# Patient Record
Sex: FEMALE | Race: ASIAN | NOT HISPANIC OR LATINO | Employment: UNEMPLOYED | URBAN - METROPOLITAN AREA
[De-identification: names, ages, dates, MRNs, and addresses within clinical notes are randomized per-mention and may not be internally consistent; named-entity substitution may affect disease eponyms.]

---

## 2017-01-05 ENCOUNTER — OFFICE VISIT (OUTPATIENT)
Dept: FAMILY MEDICINE | Facility: CLINIC | Age: 42
End: 2017-01-05

## 2017-01-05 VITALS
BODY MASS INDEX: 28.3 KG/M2 | HEIGHT: 59 IN | OXYGEN SATURATION: 98 % | DIASTOLIC BLOOD PRESSURE: 69 MMHG | HEART RATE: 68 BPM | SYSTOLIC BLOOD PRESSURE: 105 MMHG | RESPIRATION RATE: 20 BRPM | WEIGHT: 140.4 LBS | TEMPERATURE: 98.2 F

## 2017-01-05 DIAGNOSIS — H54.7 VISION PROBLEMS: ICD-10-CM

## 2017-01-05 DIAGNOSIS — R07.0 THROAT PAIN: ICD-10-CM

## 2017-01-05 DIAGNOSIS — K21.9 GASTROESOPHAGEAL REFLUX DISEASE, ESOPHAGITIS PRESENCE NOT SPECIFIED: ICD-10-CM

## 2017-01-05 DIAGNOSIS — E87.6 HYPOKALEMIA: Primary | ICD-10-CM

## 2017-01-05 LAB
BUN SERPL-MCNC: 11 MG/DL (ref 5–24)
CALCIUM SERPL-MCNC: 9.1 MG/DL (ref 8.5–10.4)
CHLORIDE SERPLBLD-SCNC: 103 MMOL/L (ref 94–109)
CO2 SERPL-SCNC: 26 MMOL/L (ref 20–32)
CREAT SERPL-MCNC: 0.5 MG/DL (ref 0.6–1.3)
EGFR CALCULATED (BLACK REFERENCE): 175.7 ML/MIN
EGFR CALCULATED (NON BLACK REFERENCE): 145.2 ML/MIN
GLUCOSE SERPL-MCNC: 132 MG/DL (ref 60–109)
POTASSIUM SERPL-SCNC: 3.8 MMOL/L (ref 3.4–5.3)
SODIUM SERPL-SCNC: 138 MMOL/L (ref 133–144)

## 2017-01-05 RX ORDER — POTASSIUM CHLORIDE 1500 MG/1
20 TABLET, EXTENDED RELEASE ORAL DAILY
Qty: 60 TABLET | Refills: 5 | Status: SHIPPED | OUTPATIENT
Start: 2017-01-05 | End: 2017-11-07

## 2017-01-05 NOTE — MR AVS SNAPSHOT
"              After Visit Summary   1/5/2017    Jimmie Barrientos    MRN: [id number]           Patient Information     Date Of Birth          1975        Visit Information        Provider Department      1/5/2017 9:00 AM Suzan Alarcon MD [facility]        Today's Diagnoses     Hypokalemia    -  1     Gastroesophageal reflux disease, esophagitis presence not specified         Vision problems           Care Instructions    Your medication list is printed, please keep this with you, it is helpful to bring this current list to any other medical appointments, the emergency room or hospital.    If you had lab testing today and your results are reassuring or normal they will be be mailed to you within 7 days.     If the lab tests need quick action we will call you with the results.   The phone number we will call with results is # [phone] (home) . If this is not the best number please call our clinic and change the number.    If you need any refills please call your pharmacy and they will contact us.    If you have any further concerns or wish to schedule another appointment you must call our office during normal business hours  [phone] (8-5:00 M-F)  If you have urgent medical questions that cannot wait  you may also call [phone] at any time of day.  If you have a medical emergency please call [phone].    Thank you for coming to [facility].      Potassium-Rich Foods  The normal adult diet usually contains 2,000 mcg to 4,000 mcg (50 to 100 mEq) of potassium per day. More potassium is needed when there is excess loss of potassium from the body. Diuretic medicines (\"water pills\"), diarrhea, and vomiting can cause this. To increase the amount of potassium in your diet, include these high potassium foods.    [The (*) indicates foods highest in potassium.]  Vegetables  Artichokes - cooked 1/2 cup, 200 mg to 300 mg*  Asparagus - cooked 1/2 cup, 200 mg to 300 mg  Beans, white, red, chirinos cooked 1/2 " cup, 300 mg to 500 mg*  Beets - cooked 1/2 cup, 200 mg to 300 mg  Broccoli - cooked or raw 1 cup, 200 mg to 500 mg*  Buckeye sprouts - cooked 1/2 cup, 200 mg to 300 mg  Cabbage - raw 1 cup, 100 mg to 200 mg  Carrots - raw or cooked 1/2 cup, 100 mg to 200 mg  Celery - raw 1 cup, 300 mg to 400 mg   Lima Beans - fresh or frozen 1/2 cup, 300 mg to 500 mg*   Mushrooms - raw or cooked 1/2 cup, 100 mg to 300 mg  Peas - cooked 1/2 cup, 200 mg to 300 mg   Potatoes - baked 1 medium, 500 mg to 900 mg*   Spinach - raw 2 cups, 300 mg to 400 mg *  Spinach - cooked 1 cups, 800 mg to 900 mg*   Squash, winter - fresh, frozen, or cooked 1/2 cup, 200 mg to 400 mg   Tomato - fresh 1 medium, 200 mg to 300 mg   Tomato juice - canned 1/2 cup, 200 mg to 300 mg   Fruits  Apple juice - unsweetened 1 cup, 200 mg to 300 mg   Apricots - canned 1/2 cup, 200 mg to 300 mg   Apricots - dried 4 pieces, 100 mg to 200 mg   Avocado - raw 1/2 cup, 300 mg to 400 mg*  Banana - fresh 1 small, 300 mg to 400 mg*   Blackberries - fresh or frozen 1 cup, 200 mg to 300 mg   Cantaloupe - fresh 1 cup diced, 300 mg to 400 mg*   Grape juice - unsweetened 1 cup, 200 mg to 300 mg   Honeydew melon - fresh 1 cup diced, 300 mg to 400 mg*   Orange juice - unsweetened, fresh or frozen 1/2 cup, 200 mg to 300 mg  Orange - fresh 1 medium, 200 mg to 300 mg    Pineapple juice - unsweetened 1 cup, 300 mg to 400 mg   Prune juice - unsweetened 1/2 cup, 300 mg to 400 mg*   Prunes - dried 5 pieces, 300 mg to 400 mg*   Strawberries - fresh or frozen 1 cup, 200 mg to 300 mg  Meat  Red Meat - cooked 3 oz, 100 mg to 300 mg   Shrimp - cooked 3/4 cup, 100 mg to 200 mg   Tuna - fresh or canned 3/4 cup, 200 mg to 500 mg   Cod, flounder, halibut - cooked 3 oz, 100 mg to 300 mg*  Gerry - cooked 3 oz, 300 mg to 400 mg*   Scallops - cooked 3 oz, 200 mg to 300 mg*    9475-5054 The [employer]. [address] [place] [address]. All rights reserved. This information is not  "intended as a substitute for professional medical care. Always follow your healthcare professional's instructions.                Follow-ups after your visit        Who to contact     Please call your clinic at [phone] to:    Ask questions about your health    Make or cancel appointments    Discuss your medicines    Learn about your test results    Speak to your doctor   If you have compliments or concerns about an experience at your clinic, or if you wish to file a complaint, please contact [facility] Patient Relations at [phone] or email us at [email or web address]         Additional Information About Your Visit        Auto [email or web address] Information     [employer]asis is an electronic gateway that provides easy, online access to your medical records. With Moasis, you can request a clinic appointment, read your test results, renew a prescription or communicate with your care team.     To sign up for Mo[employer] visit the website at [email or web address]   You will be asked to enter the access code listed below, as well as some personal information. Please follow the directions to create your username and password.     Your access code is: [email or web address]  Expires: 2017  4:59 PM     Your access code will  in 90 days. If you need help or a new code, please contact your [facility] or call [phone] for assistance.        Care EveryWhere ID     This is your Care EveryWhere ID. This could be used by other organizations to access your [facility] medical records  [id number]        Your Vitals Were     Pulse Temperature Respirations Height BMI (Body Mass Index) Pulse Oximetry    68 98.2  F (36.8  C) (Oral) 20 4' 11\" (149.9 cm) 28.34 kg/m2 98%    Last Period                   2017            Blood Pressure from Last 3 Encounters:   17 105/69   16 112/78   16 116/79    Weight from Last 3 Encounters:   17 140 lb 6.4 oz " (63.685 kg)   11/07/16 140 lb (63.504 kg)   11/04/16 142 lb 6.4 oz (64.592 kg)              We Performed the Following     Basic Metabolic Panel ([facility])  - Results < 1 hr        Primary Care Provider Office Phone # Fax #    Suzan Alarcon [phone]       [facility] [address] [place] [address]        Thank you!     Thank you for choosing [facility]  for your care. Our goal is always to provide you with excellent care. Hearing back from our patients is one way we can continue to improve our services. Please take a few minutes to complete the written survey that you may receive in the mail after your visit with us. Thank you!             Your Updated Medication List - Protect others around you: Learn how to safely use, store and throw away your medicines at www.disposemymeds.org.          This list is accurate as of: 1/5/17  9:45 AM.  Always use your most recent med list.                   Brand Name Dispense Instructions for use    azelastine 0.05 % Soln ophthalmic solution    OPTIVAR    1 Bottle    Place 1 drop into the right eye 2 times daily       cetirizine 10 MG tablet    zyrTEC    30 tablet    Take 1 tablet (10 mg) by mouth every evening       ENSURE Liqd     30 each    Take 1 Can by mouth daily       norethindrone-ethinyl estradiol 1-35 MG-MCG per tablet    ORTHO-NOVUM 1/35 (28)    84 tablet    Take 1 tablet by mouth daily Begin on Sunday after start of period       omeprazole 20 MG CR capsule    priLOSEC    30 capsule    Take 1 capsule (20 mg) by mouth 2 times daily       potassium chloride SA 20 MEQ CR tablet    potassium chloride    60 tablet    Take 1 tablet (20 mEq) by mouth 2 times daily

## 2017-01-05 NOTE — NURSING NOTE
name: Francie Barrientos  Language: Hmong  Agency: [employer]  Phone number: [phone]    Pt. Declined the PSSV 23.

## 2017-01-05 NOTE — PROGRESS NOTES
HPI:   Jimmie Barrientos is a 41 year old  female with medical history of prediabetes and hepatitis B carrier who presents to follow up fatigue and abdominal pain.     Has been feeling better, more energy, no more abdominal pain, has been eating more. Was not feeling any pain so decreased her omeprazole to once per day.     Has noticed gradual change in vision, problems with viewing things up close, has not noticed one eye more than the other. No problems with eye pain, watery eyes, or itching. Has not had vision tested yet this year, but is not sure when she is due for another check (is concerned that insurance covers only once per year). Has never needed glasses.     Periods have been regular, once per month since last visit. May be interested in birth control in the future, but not at this visit.     Has been taking potassium supplement once per day for the past couple days, was taking it twice a day previously. Denies any chest pain or palpitations.    A Shoulder Tap  was used for this visit         PMHX:     Patient Active Problem List   Diagnosis     Health Care Home     Hepatitis B carrier     Pap smear for cervical cancer screening     Prediabetes     Overweight       Current Outpatient Prescriptions   Medication Sig Dispense Refill     omeprazole (PRILOSEC) 20 MG CR capsule Take 1 capsule (20 mg) by mouth daily 30 capsule 1     potassium chloride SA (POTASSIUM CHLORIDE) 20 MEQ CR tablet Take 1 tablet (20 mEq) by mouth daily 60 tablet 5     Nutritional Supplements (ENSURE) LIQD Take 1 Can by mouth daily 30 each 0     cetirizine (ZYRTEC) 10 MG tablet Take 1 tablet (10 mg) by mouth every evening 30 tablet 1     azelastine (OPTIVAR) 0.05 % SOLN Place 1 drop into the right eye 2 times daily 1 Bottle 1       Social History   Substance Use Topics     Smoking status: Never Smoker      Smokeless tobacco: Never Used     Alcohol Use: No       Social History     Social History Narrative       Allergies   Allergen  "Reactions     No Known Allergies        No results found for this or any previous visit (from the past 24 hour(s)).         Review of Systems:   Complete Review of Systems negative except as above.          Physical Exam:     Filed Vitals:    01/05/17 0908   BP: 105/69   Pulse: 68   Temp: 98.2  F (36.8  C)   TempSrc: Oral   Resp: 20   Height: 4' 11\" (149.9 cm)   Weight: 140 lb 6.4 oz (63.685 kg)   SpO2: 98%     Body mass index is 28.34 kg/(m^2).    General: Alert, well-appearing female in NAD  HEENT: PERRL, moist oral mucus membranes  Pulm: CTA BL, no tachypnea  CV: RRR, no murmur  Abd: soft, NTND, no masses  Ext: Warm, well perfused, no LE edema  Skin: No rash on limited skin exam  Psych: Mood appropriate to visit content, full affect, rational thought content and process      Assessment and Plan   1. Hypokalemia, etiology unclear, not on diuretics, did report low PO intake at the last visit which she states is improving. She has been feeling so much better that she decreased her potassium to once per day over the past couple days. On recheck potassium is 3.8. Will have patient continue with Kdur 20mEq daily as this is how she has been taking it. Provided information about potassium rich foods. Recheck at next visit.  - Basic Metabolic Panel ([facility])  - Results < 1 hr  - potassium chloride SA (POTASSIUM CHLORIDE) 20 MEQ CR tablet; Take 1 tablet (20 mEq) by mouth daily  Dispense: 60 tablet; Refill: 5    2. Gastroesophageal reflux disease, esophagitis presence not specified, much improved per patient. She reduced her omeprazole to once daily and has been doing well, will continue at this dose.   - omeprazole (PRILOSEC) 20 MG CR capsule; Take 1 capsule (20 mg) by mouth daily  Dispense: 30 capsule; Refill: 1    3. Vision problems, likely age related presbyopia, recommended vision testing but patient was hesitant, saying that her insurance only covers testing once per year and she would like to check with her " ophthalmologist before proceeding. Recommended trial OTC reading glasses.    Options for treatment and follow-up care were reviewed with the patient and/or guardian. Jimmie Barrientos and/or guardian engaged in the decision making process and verbalized understanding of the options discussed and agreed with the final plan.    Suzan Alarcon MD  [facility], PGY1    Precepted with: Eliza Anderson MD

## 2017-01-05 NOTE — PATIENT INSTRUCTIONS
"Your medication list is printed, please keep this with you, it is helpful to bring this current list to any other medical appointments, the emergency room or hospital.    If you had lab testing today and your results are reassuring or normal they will be be mailed to you within 7 days.     If the lab tests need quick action we will call you with the results.   The phone number we will call with results is # [phone] (home) . If this is not the best number please call our clinic and change the number.    If you need any refills please call your pharmacy and they will contact us.    If you have any further concerns or wish to schedule another appointment you must call our office during normal business hours  [phone] (8-5:00 M-F)  If you have urgent medical questions that cannot wait  you may also call [phone] at any time of day.  If you have a medical emergency please call 911.    Thank you for coming to [facility].      Potassium-Rich Foods  The normal adult diet usually contains 2,000 mcg to 4,000 mcg (50 to 100 mEq) of potassium per day. More potassium is needed when there is excess loss of potassium from the body. Diuretic medicines (\"water pills\"), diarrhea, and vomiting can cause this. To increase the amount of potassium in your diet, include these high potassium foods.    [The (*) indicates foods highest in potassium.]  Vegetables  Artichokes - cooked 1/2 cup, 200 mg to 300 mg*  Asparagus - cooked 1/2 cup, 200 mg to 300 mg  Beans, white, red, chirinos cooked 1/2 cup, 300 mg to 500 mg*  Beets - cooked 1/2 cup, 200 mg to 300 mg  Broccoli - cooked or raw 1 cup, 200 mg to 500 mg*  [place] sprouts - cooked 1/2 cup, 200 mg to 300 mg  Cabbage - raw 1 cup, 100 mg to 200 mg  Carrots - raw or cooked 1/2 cup, 100 mg to 200 mg  Celery - raw 1 cup, 300 mg to 400 mg   Lima Beans - fresh or frozen 1/2 cup, 300 mg to 500 mg*   Mushrooms - raw or cooked 1/2 cup, 100 mg to 300 mg  Peas - cooked 1/2 cup, " 200 mg to 300 mg   Potatoes - baked 1 medium, 500 mg to 900 mg*   Spinach - raw 2 cups, 300 mg to 400 mg *  Spinach - cooked 1 cups, 800 mg to 900 mg*   Squash, winter - fresh, frozen, or cooked 1/2 cup, 200 mg to 400 mg   Tomato - fresh 1 medium, 200 mg to 300 mg   Tomato juice - canned 1/2 cup, 200 mg to 300 mg   Fruits  Apple juice - unsweetened 1 cup, 200 mg to 300 mg   Apricots - canned 1/2 cup, 200 mg to 300 mg   Apricots - dried 4 pieces, 100 mg to 200 mg   Avocado - raw 1/2 cup, 300 mg to 400 mg*  Banana - fresh 1 small, 300 mg to 400 mg*   Blackberries - fresh or frozen 1 cup, 200 mg to 300 mg   Cantaloupe - fresh 1 cup diced, 300 mg to 400 mg*   Grape juice - unsweetened 1 cup, 200 mg to 300 mg   Honeydew melon - fresh 1 cup diced, 300 mg to 400 mg*   Orange juice - unsweetened, fresh or frozen 1/2 cup, 200 mg to 300 mg  Orange - fresh 1 medium, 200 mg to 300 mg    Pineapple juice - unsweetened 1 cup, 300 mg to 400 mg   Prune juice - unsweetened 1/2 cup, 300 mg to 400 mg*   Prunes - dried 5 pieces, 300 mg to 400 mg*   Strawberries - fresh or frozen 1 cup, 200 mg to 300 mg  Meat  Red Meat - cooked 3 oz, 100 mg to 300 mg   Shrimp - cooked 3/4 cup, 100 mg to 200 mg   Tuna - fresh or canned 3/4 cup, 200 mg to 500 mg   Cod, flounder, halibut - cooked 3 oz, 100 mg to 300 mg*  [place] - cooked 3 oz, 300 mg to 400 mg*   Scallops - cooked 3 oz, 200 mg to 300 mg*    2857-7390 [employer]. [address] [place] [address], [place] [address]. All rights reserved. This information is not intended as a substitute for professional medical care. Always follow your healthcare professional's instructions.

## 2017-01-09 DIAGNOSIS — R07.0 THROAT PAIN: Primary | ICD-10-CM

## 2017-01-10 NOTE — PROGRESS NOTES
Preceptor Attestation:  Patient's case reviewed and discussed with Suzan Alarcon MD resident and I evaluated the patient. I agree with written assessment and plan of care.  Supervising Physician:  RUDY AMAYA MD  [facility]

## 2017-11-07 ENCOUNTER — OFFICE VISIT (OUTPATIENT)
Dept: FAMILY MEDICINE | Facility: CLINIC | Age: 42
End: 2017-11-07

## 2017-11-07 VITALS
TEMPERATURE: 98.2 F | SYSTOLIC BLOOD PRESSURE: 107 MMHG | DIASTOLIC BLOOD PRESSURE: 73 MMHG | WEIGHT: 149.8 LBS | HEART RATE: 73 BPM | RESPIRATION RATE: 18 BRPM | BODY MASS INDEX: 30.2 KG/M2 | OXYGEN SATURATION: 98 % | HEIGHT: 59 IN

## 2017-11-07 DIAGNOSIS — R73.03 PREDIABETES: ICD-10-CM

## 2017-11-07 DIAGNOSIS — J06.9 VIRAL URI WITH COUGH: Primary | ICD-10-CM

## 2017-11-07 DIAGNOSIS — E87.6 HYPOKALEMIA: ICD-10-CM

## 2017-11-07 LAB
BUN SERPL-MCNC: 6 MG/DL (ref 5–24)
CALCIUM SERPL-MCNC: 9.1 MG/DL (ref 8.5–10.4)
CHLORIDE SERPLBLD-SCNC: 100 MMOL/L (ref 94–109)
CO2 SERPL-SCNC: 26 MMOL/L (ref 20–32)
CREAT SERPL-MCNC: 0.8 MG/DL (ref 0.6–1.3)
CREAT UR-MCNC: 215 MG/DL
EGFR CALCULATED (BLACK REFERENCE): >90 ML/MIN
EGFR CALCULATED (NON BLACK REFERENCE): 84 ML/MIN
GLUCOSE SERPL-MCNC: 228 MG/DL (ref 60–109)
HBA1C MFR BLD: 6.5 % (ref 4.1–5.7)
MICROALBUMIN UR-MCNC: 0.98 MG/DL (ref 0–1.99)
MICROALBUMIN/CREAT UR: 4.6 MG/G
POTASSIUM SERPL-SCNC: 3.2 MMOL/L (ref 3.4–5.3)
SODIUM SERPL-SCNC: 135 MMOL/L (ref 133–144)

## 2017-11-07 RX ORDER — POTASSIUM CHLORIDE 1500 MG/1
20 TABLET, EXTENDED RELEASE ORAL 2 TIMES DAILY
Qty: 60 TABLET | Refills: 5 | Status: SHIPPED | OUTPATIENT
Start: 2017-11-07 | End: 2018-02-14

## 2017-11-07 RX ORDER — GUAIFENESIN/DEXTROMETHORPHAN 100-10MG/5
5 SYRUP ORAL EVERY 4 HOURS PRN
Qty: 560 ML | Refills: 0 | Status: SHIPPED | OUTPATIENT
Start: 2017-11-07 | End: 2018-02-14

## 2017-11-07 NOTE — NURSING NOTE
Pre DM  Eye referral?  MACR-in lab  PPV23-not now  Flu shot-declined until she feels better  A1c-in lab  Foot exam  Lipid  Per MD wait and see, may have pt return for DM visit  Follow up in 2-4 weeks for Prediabetes check

## 2017-11-07 NOTE — PROGRESS NOTES
"S:  Jimmie Barrientos is a 42 year old year old female with history of GERD and prediabetes who presents to discuss:    1. Sore throat: Pt has had 1 week of sore throat and cough. She describes the sore throat as the first symptoms and then a productive cough and headache shortly after. She has not taken her temperature, but does not feel like she has had a fever. She denies other symptoms and has no ear pain, neck pain or lumps, facial tenderness, or difficulty breathing. She has no known sick contacts. She has not been taking her omeprazole for reflux, no reflux symptoms. She has no other complaints or concerns.     Complete ROS otherwise negative.     O:  Vitals: /73  Pulse 73  Temp 98.2  F (36.8  C) (Oral)  Resp 18  Ht 4' 10.75\" (149.2 cm)  Wt 149 lb 12.8 oz (67.9 kg)  SpO2 98%  BMI 30.51 kg/m2    General: Alert, well-appearing, NAD.  HEENT: PERRL, moist oral mucus membranes. No tonsillar exudates. No pharyngeal erythema. No uvular deviation or PTA. TM's normal bilaterally with no bulging or erythema. Ear canals without drainage.   Neck: no anterior or posterior cervical lymphadenopathy  Pulm: CTA BL, no tachypnea. No respiratory distress.   CV: RRR, no murmur  Abd: soft, NTND, no masses. No splenomegaly  Ext: Warm, well perfused  Skin: No rash on limited skin exam  Psych: Mood appropriate to visit content, full affect, rational thought content and process      A/P:    1. Viral URI: sore throat, cough, headache all consistent with viral URI. No evidence to suggest more dangerous etiology such as strep pharyngitis, PTA, sinusitis, mononucleosis, etc. Patient will receive symptomatic     2. Impaired fasting glucose: we will recheck this again today and schedule an appointment for education about her blood sugar and lifestyle modifications.   - Hemoglobin A1c ([facility])  - Basic Metabolic Panel ([facility]) - Results < 1 hr  Labs show A1c 6.5 in diabetic range. We discussed a close follow up visit in 2-4 weeks to " talk about diabetes. Discussed that even though patient feels fine, her labs show high blood sugars which can lead to being sick later on.    3. History of hypokalemia. Will get BMP today to recheck this. Unclear etiology in past, but may be related to blood sugar.  Potassium is 3.2 likely related to diuresis with high blood sugars. Will increase K supplementation and recheck at next visit.     Nacho Tan, MS4    The medical student acted as a scribe and the encounter documented above was performed completely by me and the documentation accurately reflects the work I have performed today.      Suzan lAarcon MD  [facility] Resident  Pager     Precepted with: Nikolay Wade MD

## 2017-11-07 NOTE — MR AVS SNAPSHOT
"              After Visit Summary   11/7/2017    Jimmie Barrientos    MRN: [id number]           Patient Information     Date Of Birth          1975        Visit Information        Provider Department      11/7/2017 9:40 AM Suzan Alarcon MD [facility]        Today's Diagnoses     Viral URI with cough    -  1    Prediabetes           Follow-ups after your visit        Follow-up notes from your care team     Return in about 2 weeks (around 11/21/2017) for diabetes.      Your next 10 appointments already scheduled     Nov 21, 2017  9:40 AM CST   Return Visit with Suzan Alarcon MD   [facility] ([facility])    [address] [place] [address]   [phone]              Who to contact     Please call your clinic at [phone] to:    Ask questions about your health    Make or cancel appointments    Discuss your medicines    Learn about your test results    Speak to your doctor   If you have compliments or concerns about an experience at your clinic, or if you wish to file a complaint, please contact [facility] Patient Relations at [phone] or email us at [email or web address]         Additional Information About Your Visit        Care EveryWhere ID     This is your Care EveryWhere ID. This could be used by other organizations to access your [place] medical records  [id number]        Your Vitals Were     Pulse Temperature Respirations Height Pulse Oximetry BMI (Body Mass Index)    73 98.2  F (36.8  C) (Oral) 18 4' 10.75\" (149.2 cm) 98% 30.51 kg/m2       Blood Pressure from Last 3 Encounters:   11/07/17 107/73   01/05/17 105/69   11/07/16 112/78    Weight from Last 3 Encounters:   11/07/17 149 lb 12.8 oz (67.9 kg)   01/05/17 140 lb 6.4 oz (63.7 kg)   11/07/16 140 lb (63.5 kg)              We Performed the Following     Basic Metabolic Panel ([facility]) - Results < 1 hr     Hemoglobin A1c ([facility])     Microalbumin Creatinine Ratio Random Ur " ([facility])          Today's Medication Changes          These changes are accurate as of: 11/7/17 10:47 AM.  If you have any questions, ask your nurse or doctor.               Start taking these medicines.        Dose/Directions    guaiFENesin-dextromethorphan 100-10 MG/5ML syrup   Commonly known as:  ROBITUSSIN DM   Used for:  Viral URI with cough   Started by:  Suzan Alarcon MD        Dose:  5 mL   Take 5 mLs by mouth every 4 hours as needed for cough   Quantity:  560 mL   Refills:  0         These medicines have changed or have updated prescriptions.        Dose/Directions    potassium chloride SA 20 MEQ CR tablet   Commonly known as:  KLOR-CON   This may have changed:  when to take this   Used for:  Hypokalemia   Changed by:  Suzan Alarcon MD        Dose:  20 mEq   Take 1 tablet (20 mEq) by mouth 2 times daily   Quantity:  60 tablet   Refills:  5            Where to get your medicines      These medications were sent to [facility] - [place], [place] - [address] [place] [address] Pkw[address]  [address], [place] [address]     Phone:  [phone]     guaiFENesin-dextromethorphan 100-10 MG/5ML syrup    potassium chloride SA 20 MEQ CR tablet                Primary Care Provider Office Phone # Fax #    Suzan Alarcon [phone]       [facility] [address] [place] [address]        Equal Access to Services     [facility]EL[facility] AH: Hadii aad ku hadasho Soomaali, waaxda luqadaha, qaybta kaalmada adeegyada, santo obwen haystephanie hong . So [facility] [phone].    ATENCIÓN: Si habla español, tiene a lopez disposición servicios gratuitos de asistencia lingüística. Janiya al [phone].    We comply with applicable federal civil rights laws and [place] laws. We do not discriminate on the basis of race, color, national origin, age, disability, sex, sexual orientation, or gender identity.            Thank you!     Thank you for choosing [facility]  for your  care. Our goal is always to provide you with excellent care. Hearing back from our patients is one way we can continue to improve our services. Please take a few minutes to complete the written survey that you may receive in the mail after your visit with us. Thank you!             Your Updated Medication List - Protect others around you: Learn how to safely use, store and throw away your medicines at www.disposemymeds.org.          This list is accurate as of: 11/7/17 10:47 AM.  Always use your most recent med list.                   Brand Name Dispense Instructions for use Diagnosis    azelastine 0.05 % Soln ophthalmic solution    OPTIVAR    1 Bottle    Place 1 drop into the right eye 2 times daily    Chronic allergic conjunctivitis       cetirizine 10 MG tablet    zyrTEC    30 tablet    Take 1 tablet (10 mg) by mouth every evening    Throat pain       ENSURE Liqd     30 each    Take 1 Can by mouth daily    Other fatigue, Hypokalemia       guaiFENesin-dextromethorphan 100-10 MG/5ML syrup    ROBITUSSIN DM    560 mL    Take 5 mLs by mouth every 4 hours as needed for cough    Viral URI with cough       omeprazole 20 MG CR capsule    priLOSEC    30 capsule    Take 1 capsule (20 mg) by mouth daily    Throat pain       potassium chloride SA 20 MEQ CR tablet    KLOR-CON    60 tablet    Take 1 tablet (20 mEq) by mouth 2 times daily    Hypokalemia

## 2017-11-07 NOTE — PROGRESS NOTES
Preceptor Attestation:  Patient's case reviewed and discussed with Suzan Alarcon MD resident and I evaluated the patient. I agree with written assessment and plan of care.  Supervising Physician:Nikolay Wade MD    [facility]

## 2017-11-28 ENCOUNTER — RECORDS - HEALTHEAST (OUTPATIENT)
Dept: ADMINISTRATIVE | Facility: OTHER | Age: 42
End: 2017-11-28

## 2017-11-28 ENCOUNTER — OFFICE VISIT (OUTPATIENT)
Dept: FAMILY MEDICINE | Facility: CLINIC | Age: 42
End: 2017-11-28

## 2017-11-28 VITALS
HEIGHT: 59 IN | BODY MASS INDEX: 30.08 KG/M2 | SYSTOLIC BLOOD PRESSURE: 112 MMHG | OXYGEN SATURATION: 98 % | DIASTOLIC BLOOD PRESSURE: 76 MMHG | RESPIRATION RATE: 20 BRPM | WEIGHT: 149.2 LBS | HEART RATE: 71 BPM | TEMPERATURE: 98.4 F

## 2017-11-28 DIAGNOSIS — E87.6 HYPOKALEMIA: ICD-10-CM

## 2017-11-28 DIAGNOSIS — E11.9 TYPE 2 DIABETES MELLITUS WITHOUT COMPLICATION, WITHOUT LONG-TERM CURRENT USE OF INSULIN (H): Primary | ICD-10-CM

## 2017-11-28 LAB
ANION GAP SERPL CALCULATED.3IONS-SCNC: 13 MMOL/L (ref 5–18)
BUN SERPL-MCNC: 10 MG/DL (ref 8–22)
CALCIUM SERPL-MCNC: 9 MG/DL (ref 8.5–10.5)
CHLORIDE SERPL-SCNC: 106 MMOL/L (ref 98–107)
CHOLEST SERPL-MCNC: 140 MG/DL
CO2 SERPL-SCNC: 21 MMOL/L (ref 22–31)
CREAT SERPL-MCNC: 0.67 MG/DL (ref 0.6–1.1)
FASTING?: ABNORMAL
GLUCOSE SERPL-MCNC: 128 MG/DL (ref 70–125)
HDLC SERPL-MCNC: 44 MG/DL
LDLC SERPL CALC-MCNC: 72 MG/DL
POTASSIUM SERPL-SCNC: 4 MMOL/L (ref 3.5–5)
SODIUM SERPL-SCNC: 140 MMOL/L (ref 136–145)
TRIGL SERPL-MCNC: 119 MG/DL

## 2017-11-28 NOTE — MR AVS SNAPSHOT
After Visit Summary   11/28/2017    Jimmie Famg    MRN: [id number]           Patient Information     Date Of Birth          1975        Visit Information        Provider Department      11/28/2017 8:20 AM Suzan Alarcon MD [facility]        Today's Diagnoses     Type 2 diabetes mellitus without complication, without long-term current use of insulin (H)    -  1    Hypokalemia          Care Instructions    Thanks for coming in today [facility]!    Topics discussed this visit:   1. Type 2 diabetes mellitus without complication, without long-term current use of insulin (H)  - OPHTHALMOLOGY ADULT REFERRAL; Future  - DIABETES EDUCATOR REFERRAL; Future  - Lipid Panel ([facility]) - Results < 1 hr    2. Hypokalemia (low potassium)  - Basic Metabolic Panel ([facility] )  - Results < 1 hr          Follow up in 2-4 weeks to talk more about diabetes.       Your medication list is printed, please keep this with you, it is helpful to bring this current list to any other medical appointments, the emergency room or hospital.    If you had lab testing today and your results are reassuring or normal they will be be mailed to you within 7 days.     If the lab tests need quick action we will call you with the results.   The phone number we will call with results is # [phone] (home) . If this is not the best number please call our clinic and change the number.    If you need any refills please call your pharmacy and they will contact us.    If you have any further concerns or wish to schedule another appointment you must call our office during normal business hours  [phone] (8-5:00 M-F)  If you have urgent medical questions that cannot wait  you may also call [phone] at any time of day.  If you have a medical emergency please call [phone].    Thank you for coming to [facility].            Follow-ups after your visit        Additional Services     DIABETES EDUCATOR REFERRAL       New  Diagnosis    Diabetes Education Order:  Choose either self management    Diabetes Self Management Class  One on One with Diabetes Educator:  Medical Nutrition Therapy, Glucose Monitoring, Meal Planning  and Weight Management/Exercise   Insulin Start or Adjust:                        New to insulin?  No                        RN to calculate and adjust insulin?                           Patient to continue oral medicine?  Yes       needed: Yes  Language: Alexander    Recent labs including A1C, Lipid panel, FBS or casual glucose or GCT:    Lab Results       Component                Value               Date                       A1C                      6.5                 11/07/2017                 A1C                      5.9                 09/20/2016            Lab Results       Component                Value               Date                       CHOL                     150                 10/25/2016            Lab Results       Component                Value               Date                       HDL                      49                  10/25/2016            Lab Results       Component                Value               Date                       LDL                      86                  10/25/2016            No results found for: TRIG  No results found for: CHOLHDLRATIO  Lab Results       Component                Value               Date                       GLC                      228.0               11/07/2017                 GLC                      132.0               01/05/2017          ]    ([facility]) Referral should be tracked (Yes/No)?            OPHTHALMOLOGY ADULT REFERRAL       Patient prefers to be called    Reason for Referral: new diabetes, eye exam     needed: Yes  Language: Alexander    May leave message on voicemail: Yes    ([facility] Only) Referral should be tracked (Yes/No)? No                  Future tests that were ordered for you today     Open Future Orders      "   Priority Expected Expires Ordered    OPHTHALMOLOGY ADULT REFERRAL Routine  11/28/2018 11/28/2017    DIABETES EDUCATOR REFERRAL Routine  4/28/2018 11/28/2017            Who to contact     Please call your clinic at [phone] to:    Ask questions about your health    Make or cancel appointments    Discuss your medicines    Learn about your test results    Speak to your doctor   If you have compliments or concerns about an experience at your clinic, or if you wish to file a complaint, please contact [facility] Patient Relations at [phone] or email us at [email or web address]         Additional Information About Your Visit        Care EveryWhere ID     This is your Care EveryWhere ID. This could be used by other organizations to access your [place] medical records  [id number]        Your Vitals Were     Pulse Temperature Respirations Height Pulse Oximetry BMI (Body Mass Index)    71 98.4  F (36.9  C) 20 4' 10.75\" (149.2 cm) 98% 30.39 kg/m2       Blood Pressure from Last 3 Encounters:   11/28/17 112/76   11/07/17 107/73   01/05/17 105/69    Weight from Last 3 Encounters:   11/28/17 149 lb 3.2 oz (67.7 kg)   11/07/17 149 lb 12.8 oz (67.9 kg)   01/05/17 140 lb 6.4 oz (63.7 kg)              We Performed the Following     Basic Metabolic Panel ([facility])  - Results < 1 hr     Lipid Panel ([facility]) - Results < 1 hr        Primary Care Provider Office Phone # Fax #    Suzan Alarcon -[phone]       [facility] [place] [address]        Equal Access to Services     [facility] HospitalRUKHSANA AH: Hadii jing Valdes, waanatoliyda lauren, qaybta natyalsanto leslie. So [facility] [phone].    ATENCIÓN: Si habla español, tiene a lopez disposición servicios gratuitos de asistencia lingüística. Janiya cook [phone].    We comply with applicable federal civil rights laws and [place] laws. We do not discriminate " on the basis of race, color, national origin, age, disability, sex, sexual orientation, or gender identity.            Thank you!     Thank you for choosing [facility]  for your care. Our goal is always to provide you with excellent care. Hearing back from our patients is one way we can continue to improve our services. Please take a few minutes to complete the written survey that you may receive in the mail after your visit with us. Thank you!             Your Updated Medication List - Protect others around you: Learn how to safely use, store and throw away your medicines at www.disposemymeds.org.          This list is accurate as of: 11/28/17  8:59 AM.  Always use your most recent med list.                   Brand Name Dispense Instructions for use Diagnosis    azelastine 0.05 % Soln ophthalmic solution    OPTIVAR    1 Bottle    Place 1 drop into the right eye 2 times daily    Chronic allergic conjunctivitis       cetirizine 10 MG tablet    zyrTEC    30 tablet    Take 1 tablet (10 mg) by mouth every evening    Throat pain       ENSURE Liqd     30 each    Take 1 Can by mouth daily    Other fatigue, Hypokalemia       guaiFENesin-dextromethorphan 100-10 MG/5ML syrup    ROBITUSSIN DM    560 mL    Take 5 mLs by mouth every 4 hours as needed for cough    Viral URI with cough       omeprazole 20 MG CR capsule    priLOSEC    30 capsule    Take 1 capsule (20 mg) by mouth daily    Throat pain       potassium chloride SA 20 MEQ CR tablet    KLOR-CON    60 tablet    Take 1 tablet (20 mEq) by mouth 2 times daily    Hypokalemia

## 2017-11-28 NOTE — PROGRESS NOTES
"SUBJECTIVE:  Jimmie Barrientos is a 42 year old who presents today for follow up of DIABETES MELLITUS. Newly diagnosed at last visit, previously was pre-diabetic range, now A1C is 6.5%.       New diagnosis of diabetes  Mother with diabetes on oral meds  Does not have a BG meter, has never checked BP    Health maintenance reviewed and appropriate orders placed?  Yes      BP Readings from Last 3 Encounters:   11/28/17 112/76   11/07/17 107/73   01/05/17 105/69       Lab Results   Component Value Date    A1C 6.5 11/07/2017    A1C 5.9 09/20/2016    A1C 5.7 10/08/2012       Recent Labs   Lab Test  10/25/16   1210   CHOL  150   HDL  49*   LDL  86       Wt Readings from Last 3 Encounters:   11/28/17 149 lb 3.2 oz (67.7 kg)   11/07/17 149 lb 12.8 oz (67.9 kg)   01/05/17 140 lb 6.4 oz (63.7 kg)       Current Outpatient Prescriptions   Medication Sig Dispense Refill     guaiFENesin-dextromethorphan (ROBITUSSIN DM) 100-10 MG/5ML syrup Take 5 mLs by mouth every 4 hours as needed for cough 560 mL 0     potassium chloride SA (KLOR-CON) 20 MEQ CR tablet Take 1 tablet (20 mEq) by mouth 2 times daily 60 tablet 5     omeprazole (PRILOSEC) 20 MG CR capsule Take 1 capsule (20 mg) by mouth daily 30 capsule 11     Nutritional Supplements (ENSURE) LIQD Take 1 Can by mouth daily 30 each 0     cetirizine (ZYRTEC) 10 MG tablet Take 1 tablet (10 mg) by mouth every evening 30 tablet 1     azelastine (OPTIVAR) 0.05 % SOLN Place 1 drop into the right eye 2 times daily 1 Bottle 1       Histories reviewed and updated in Epic.      ROS:  C: NEGATIVE for fatigue, unexpected change in weight  E: NEGATIVE for acute vision problems or changes  R: NEGATIVE for significant cough or shortness of breath  CV: NEGATIVE for chest pain, palpitations or new or worsening peripheral edema  P: NEGATIVE for changes in mood or affect    EXAM:  Vitals: /76  Pulse 71  Temp 98.4  F (36.9  C)  Resp 20  Ht 4' 10.75\" (149.2 cm)  Wt 149 lb 3.2 oz (67.7 kg)  SpO2 98%  " BMI 30.39 kg/m2  BMIE= Body mass index is 30.39 kg/(m^2).  GENERAL APPEARANCE: alert and no acute distress  PSYCH: mentation appears normal and affect normal/bright  RESP: lungs clear to auscultation - no rales, rhonchi or wheezes  CV: regular rate and rhythm, normal S1 S2, no S3 or S4 and no murmur, click or rub -  EXT: no cyanosis or edema in lower extremities  SKIN: no venous stasis changes  Foot Exam: normal DP and PT pulses, no trophic changes or ulcerative lesions, normal sensory exam and normal monofilament exam    ASSESSMENT AND PLAN:  (E11.9) Type 2 diabetes mellitus without complication, without long-term current use of insulin (H)  (primary encounter diagnosis)  Comment:     Just getting started with DM education and lifestyle modification. Did discuss the possibility of starting oral medication at our next visit. Briefly outlined diabetic diet and lifestyle changes. Also discussed foot and eye exams. I would like to talk more about diabetes and long term plans at next visit in 2-4 weeks.   Plan: OPHTHALMOLOGY ADULT REFERRAL, DIABETES EDUCATOR        REFERRAL, Lipid [place] ([facility]) - Results         > 1hr, CANCELED: Lipid Panel ([facility]) - Results        < 1 hr            (E87.6) Hypokalemia  Comment: currently taking 20mg potassium once per day, 3.2 last time. Will check again. No nausea/vomiting or diarrhea. Thought to be secondary to urinary loss with glucosuria.  Plan: Basic Metabolic Profile ([facility]), CANCELED:        Basic Metabolic Panel ([facility])  - Results < 1         hr        Suzan Alarcon    Precepted with: Yolie Fairbanks MD

## 2017-11-28 NOTE — PATIENT INSTRUCTIONS
Thanks for coming in today Jimmie Barrientos!    Topics discussed this visit:   1. Type 2 diabetes mellitus without complication, without long-term current use of insulin (H)  - OPHTHALMOLOGY ADULT REFERRAL; Future  - DIABETES EDUCATOR REFERRAL; Future  - [facility] - Results < 1 hr    2. Hypokalemia (low potassium)  - Basic Metabolic Panel ([facility])  - Results < 1 hr          Follow up in 2-4 weeks to talk more about diabetes.       Your medication list is printed, please keep this with you, it is helpful to bring this current list to any other medical appointments, the emergency room or hospital.    If you had lab testing today and your results are reassuring or normal they will be be mailed to you within 7 days.     If the lab tests need quick action we will call you with the results.   The phone number we will call with results is # [phone] (home) . If this is not the best number please call our clinic and change the number.    If you need any refills please call your pharmacy and they will contact us.    If you have any further concerns or wish to schedule another appointment you must call our office during normal business hours  [phone] (8-5:00 M-F)  If you have urgent medical questions that cannot wait  you may also call [phone] at any time of day.  If you have a medical emergency please call 144.    Thank you for coming to [facility].    Referral for (Test):OPHTHALMOLOGY   Location/Place/Provider:    Date/Time:    Phone:   Fax:   Additional information/prep.: I called to help the pt setup this appointment, pt stated that she was not interested in getting this done right now.    Scheduled by: FREDDIE Child

## 2017-12-11 NOTE — PROGRESS NOTES
Preceptor Attestation:  Patient's case reviewed and discussed with Suzan Alarcon MD.  Patient seen and discussed with the resident.  I agree with assessment and plan of care.  Supervising Physician:  Yolie Fairbanks MD  [facility]

## 2017-12-12 ENCOUNTER — OFFICE VISIT - HEALTHEAST (OUTPATIENT)
Dept: UROLOGY | Facility: CLINIC | Age: 42
End: 2017-12-12

## 2017-12-12 DIAGNOSIS — N20.9 URINARY TRACT STONES: ICD-10-CM

## 2017-12-12 DIAGNOSIS — N13.2 HYDRONEPHROSIS WITH URINARY OBSTRUCTION DUE TO URETERAL CALCULUS: ICD-10-CM

## 2017-12-12 DIAGNOSIS — N20.1 URETERAL STONE: ICD-10-CM

## 2017-12-12 DIAGNOSIS — R10.9 FLANK PAIN: ICD-10-CM

## 2017-12-12 DIAGNOSIS — N20.1 CALCULUS OF URETER: ICD-10-CM

## 2017-12-13 ENCOUNTER — COMMUNICATION - HEALTHEAST (OUTPATIENT)
Dept: UROLOGY | Facility: CLINIC | Age: 42
End: 2017-12-13

## 2017-12-20 ENCOUNTER — COMMUNICATION - HEALTHEAST (OUTPATIENT)
Dept: UROLOGY | Facility: CLINIC | Age: 42
End: 2017-12-20

## 2017-12-22 ENCOUNTER — COMMUNICATION - HEALTHEAST (OUTPATIENT)
Dept: UROLOGY | Facility: CLINIC | Age: 42
End: 2017-12-22

## 2018-02-14 ENCOUNTER — OFFICE VISIT (OUTPATIENT)
Dept: FAMILY MEDICINE | Facility: CLINIC | Age: 43
End: 2018-02-14
Payer: COMMERCIAL

## 2018-02-14 VITALS
DIASTOLIC BLOOD PRESSURE: 82 MMHG | WEIGHT: 149 LBS | HEART RATE: 73 BPM | OXYGEN SATURATION: 97 % | SYSTOLIC BLOOD PRESSURE: 116 MMHG | TEMPERATURE: 97.5 F | HEIGHT: 59 IN | BODY MASS INDEX: 30.04 KG/M2

## 2018-02-14 DIAGNOSIS — H10.45 CHRONIC ALLERGIC CONJUNCTIVITIS: ICD-10-CM

## 2018-02-14 PROBLEM — N20.9 URINARY TRACT STONES: Status: ACTIVE | Noted: 2017-12-13

## 2018-02-14 RX ORDER — AZELASTINE HYDROCHLORIDE 0.5 MG/ML
1 SOLUTION/ DROPS OPHTHALMIC 2 TIMES DAILY
Qty: 1 BOTTLE | Refills: 1 | Status: SHIPPED | OUTPATIENT
Start: 2018-02-14 | End: 2019-10-22

## 2018-02-14 RX ORDER — CETIRIZINE HYDROCHLORIDE 10 MG/1
10 TABLET ORAL EVERY EVENING
Qty: 30 TABLET | Refills: 1 | Status: SHIPPED | OUTPATIENT
Start: 2018-02-14 | End: 2019-10-22

## 2018-02-14 NOTE — PROGRESS NOTES
"       HPI       Jimmie Barrientos is a 42 year old female who presents for:  Chief Complaint   Patient presents with     Eye Itching Both Eyes     possible allgery, itchy, watery eyes for 1 week     Medication Reconciliation     completed - pt not taking any medications       Allergic conjunctivitis  - has a hx of allergic conjunctivitis  - over the last week her symptoms have been bothersome  - itchy/watery eyes  - she has been rubbing them and they are getting more irritated  - also has a runny nose  - has had medication and eye drops in the past which has helped symptoms.    A [employer]  was used for this visit     ROS: Complete 6 point ROS completed and negative other than stated above.           Physical Exam:     Vitals:    02/14/18 1317   BP: 116/82   Pulse: 73   Temp: 97.5  F (36.4  C)   TempSrc: Oral   SpO2: 97%   Weight: 149 lb (67.6 kg)   Height: 4' 10.75\" (149.2 cm)     Body mass index is 30.35 kg/(m^2).  Vitals were reviewed and were normal    General: Alert and orientated in NAD. Pleasant and cooperative.   HEENT: EOMI, sclera without injection or icterus. Mucus membranes moist  - bilateral eyes with mild clear discharge, R>L  - no allergic salute  Cards: Extremities warm and well-perfused  Resp: No increased work of breathing  MSK: moving all extremities w/o difficulty or deficit  Skin: no rashes, lesions, or lacerations  Psych: mood good, affect congruent    Assessment and Plan     1. Chronic allergic conjunctivitis  Refilled prior medications.  - cetirizine (ZYRTEC) 10 MG tablet; Take 1 tablet (10 mg) by mouth every evening  Dispense: 30 tablet; Refill: 1  - azelastine (OPTIVAR) 0.05 % SOLN ophthalmic solution; Place 1 drop into the right eye 2 times daily  Dispense: 1 Bottle; Refill: 1    RTC in 1-2 months for CPE    Options for treatment and follow-up care were reviewed with the patient. Jimmie Barrientos  engaged in the decision making process and verbalized understanding of the options discussed and " agreed with the final plan.    Precepted with: MD Dayana Diaz MD (PGY2)  Pager: [phone]  [facility] Resident

## 2018-02-14 NOTE — PROGRESS NOTES
Preceptor Attestation:  Patient's case reviewed and discussed with Dayana Duque MD Patient seen and discussed with the resident.. I agree with assessment and plan of care.  Supervising Physician:  Hung Sanders MD  [facility]

## 2018-02-14 NOTE — MR AVS SNAPSHOT
"              After Visit Summary   2018    Jimmie Barrientos    MRN: [id number]           Patient Information     Date Of Birth          1975        Visit Information        Provider Department      2018 1:40 PM Dayana Duque MD [facility]        Today's Diagnoses     Chronic allergic conjunctivitis          Care Instructions    Come back to see us in the next 1-2 months for a wellness exam.          Follow-ups after your visit        Who to contact     Please call your clinic at [phone] to:    Ask questions about your health    Make or cancel appointments    Discuss your medicines    Learn about your test results    Speak to your doctor            Additional Information About Your Visit        MyChart Information     [employer] is an electronic gateway that provides easy, online access to your medical records. With [employer], you can request a clinic appointment, read your test results, renew a prescription or communicate with your care team.     To sign up for [employer] visit the website at [email or web address]   You will be asked to enter the access code listed below, as well as some personal information. Please follow the directions to create your username and password.     Your access code is: [email or web address]  Expires: 5/15/2018  2:05 PM     Your access code will  in 90 days. If you need help or a new code, please contact your [facility] or call [phone] for assistance.        Care EveryWhere ID     This is your Care EveryWhere ID. This could be used by other organizations to access your [place] medical records  [id number]        Your Vitals Were     Pulse Temperature Height Pulse Oximetry BMI (Body Mass Index)       73 97.5  F (36.4  C) (Oral) 4' 10.75\" (149.2 cm) 97% 30.35 kg/m2        Blood Pressure from Last 3 Encounters:   18 116/82   17 112/76   17 107/73    Weight from Last 3 Encounters:   18 149 lb (67.6 " kg)   11/28/17 149 lb 3.2 oz (67.7 kg)   11/07/17 149 lb 12.8 oz (67.9 kg)              Today, you had the following     No orders found for display         Where to get your medicines      These medications were sent to [facility] - [place], [place] - [address]  [address], [place] [address]     Phone:  [phone]     azelastine 0.05 % Soln ophthalmic solution    cetirizine 10 MG tablet          Primary Care Provider Office Phone # Fax #    Suzan Alarcon [phone]       [facility] [address] [place] [address]        Equal Access to Services     KRYSTINA BOSS : Hadii aad ku hadasho Soomaali, waaxda luqadaha, qaybta kaalmada adeegyada, waxay katerynain haystephanie hong . So [facility] [phone].    ATENCIÓN: Si habla español, tiene a lopez disposición servicios gratuitos de asistencia lingüística. Llame al [phone].    We comply with applicable federal civil rights laws and [place] laws. We do not discriminate on the basis of race, color, national origin, age, disability, sex, sexual orientation, or gender identity.            Thank you!     Thank you for choosing [facility]  for your care. Our goal is always to provide you with excellent care. Hearing back from our patients is one way we can continue to improve our services. Please take a few minutes to complete the written survey that you may receive in the mail after your visit with us. Thank you!             Your Updated Medication List - Protect others around you: Learn how to safely use, store and throw away your medicines at www.disposemymeds.org.          This list is accurate as of 2/14/18  2:05 PM.  Always use your most recent med list.                   Brand Name Dispense Instructions for use Diagnosis    azelastine 0.05 % Soln ophthalmic solution    OPTIVAR    1 Bottle    Place 1 drop into the right eye 2 times daily    Chronic allergic conjunctivitis        cetirizine 10 MG tablet    zyrTEC    30 tablet    Take 1 tablet (10 mg) by mouth every evening    Chronic allergic conjunctivitis

## 2018-05-31 ENCOUNTER — OFFICE VISIT (OUTPATIENT)
Dept: FAMILY MEDICINE | Facility: CLINIC | Age: 43
End: 2018-05-31
Payer: COMMERCIAL

## 2018-05-31 VITALS
WEIGHT: 148 LBS | BODY MASS INDEX: 29.84 KG/M2 | SYSTOLIC BLOOD PRESSURE: 103 MMHG | DIASTOLIC BLOOD PRESSURE: 71 MMHG | HEIGHT: 59 IN | OXYGEN SATURATION: 99 % | TEMPERATURE: 98.2 F | HEART RATE: 68 BPM

## 2018-05-31 DIAGNOSIS — J02.9 VIRAL PHARYNGITIS: Primary | ICD-10-CM

## 2018-05-31 RX ORDER — IBUPROFEN 600 MG/1
600 TABLET, FILM COATED ORAL EVERY 6 HOURS PRN
Qty: 90 TABLET | Refills: 1 | Status: SHIPPED | OUTPATIENT
Start: 2018-05-31 | End: 2019-09-12

## 2018-05-31 NOTE — PATIENT INSTRUCTIONS
Viral Pharyngitis (Sore Throat)    You or your child have pharyngitis (sore throat). This infection is caused by a virus. It can cause throat pain that is worse when swallowing, aching all over, headache, and fever. The infection may be spread by coughing, kissing, or touching others after touching your mouth or nose. Antibiotic medicines do not work against viruses. They are not used for treating this illness.  Home care    If symptoms are severe, you or your child should rest at home. Return to work or school when you or your child feel well enough.     You or your child should drink plenty of fluids to prevent dehydration.    Use throat lozenges or numbing throat sprays to help reduce pain. Gargling with warm salt water will also help reduce throat pain. Dissolve 1/2 teaspoon of salt in 1 glass of warm water. Children can sip on juice or a popsicle. Children 5 years and older can also suck on a lollipop or hard candy.    Don t eat salty or spicy foods or give them to your child. These can be irritating to the throat.  Medicines for a child: You can give your child acetaminophen for fever, fussiness, or discomfort. In babies over 6 months of age, you may use ibuprofen instead of acetaminophen. If your child has chronic liver or kidney disease or ever had a stomach ulcer or GI bleeding, talk with your child s healthcare provider before giving these medicines. Aspirin should never be used by any child under 18 years of age who has a fever. It may cause severe liver damage.  Medicines for an adult: You may use acetaminophen or ibuprofen to control pain or fever, unless another medicine was prescribed for this. If you have chronic liver or kidney disease or ever had a stomach ulcer or GI bleeding, talk with your healthcare provider before using these medicines.  Follow-up care  Follow up with a healthcare provider or our staff if you or your child are not getting better over the next week.  When to seek medical  advice  Call your healthcare provider right away if any of these occur:    Fever as directed by your healthcare provider.  For children, seek care if:  ? Your child is of any age and has repeated fevers above 104 F (40 C).  ? Your child is younger than 2 years of age and has a fever of 100.4 F (38 C) for more than 1 day.  ? Your child is 2 years old or older and has a fever of 100.4 F (38 C) for more than 3 days.    New or worsening ear pain, sinus pain, or headache    Painful lumps in the back of neck    Stiff neck    Lymph nodes are getting larger    Can t swallow liquids, a lot of drooling, or can t open mouth wide due to throat pain    Signs of dehydration, such as very dark urine or no urine, sunken eyes, dizziness    Trouble breathing or noisy breathing    Muffled voice    New rash    Other symptoms are getting worse  Date Last Reviewed: 10/1/2017    6430-9978 [employer]. [address] [place] [address], [place] [address]. All rights reserved. This information is not intended as a substitute for professional medical care. Always follow your healthcare professional's instructions.

## 2018-05-31 NOTE — PROGRESS NOTES
Preceptor Attestation:   Patient seen, evaluated and discussed with the resident. I have verified the content of the note, which accurately reflects my assessment of the patient and the plan of care.  Supervising Physician:Johanny Vigil MD  [facility]

## 2018-05-31 NOTE — PROGRESS NOTES
Chief Complaint   Patient presents with     Throat Problem     sore, but also has runny nose and nasal congestion for about 1 week now.      Medication Reconciliation     completed but needs attention.          S:  Jimmie Barrientos is a 42 year old year old female who  has a past medical history of Abnormal maternal glucose tolerance, antepartum (1/8/2013); Abnormal maternal glucose tolerance, complicating pregnancy, childbirth, or the puerperium, unspecified as to episode of care (1/8/2013); and Hepatitis B carrier (H) (1/8/2013). who presents to discuss:    1. Sore throat  -1 week ago noticed sore throat, cough, runny nose, watery eyes  -no fevers  -no one else is sick at home  -history of allergies, itchy eyes  - no nausea/vomiting diarrhea no other symptoms      Complete ROS otherwise negative.     Past Medical History:   Diagnosis Date     Abnormal maternal glucose tolerance, antepartum 1/8/2013     Abnormal maternal glucose tolerance, complicating pregnancy, childbirth, or the puerperium, unspecified as to episode of care 1/8/2013     Hepatitis B carrier (H) 1/8/2013     Past Surgical History:   Procedure Laterality Date     NO HISTORY OF SURGERY       Family History   Problem Relation Age of Onset     Family History Negative Other      DIABETES Mother      Coronary Artery Disease No family hx of      Breast Cancer No family hx of      Colon Cancer No family hx of      Prostate Cancer No family hx of      Other Cancer No family hx of      Social History     Social History     Marital status:      Spouse name: N/A     Number of children: N/A     Years of education: N/A     Occupational History     Not on file.     Social History Main Topics     Smoking status: Never Smoker     Smokeless tobacco: Never Used     Alcohol use No     Drug use: No     Sexual activity: Not on file     Other Topics Concern     Not on file     Social History Narrative       O:  Vitals: /71  Pulse 68  Temp 98.2  F (36.8  C)  "(Oral)  Ht 4' 10.86\" (149.5 cm)  Wt 148 lb (67.1 kg)  LMP 05/29/2018  SpO2 99%  BMI 30.04 kg/m2    General: Alert, well-appearing, no acute distress  HEENT: PERRL, moist oral mucus membranes, oropharynx clear with some cobblestoning, tonsils 1+ and symmetric no exudates, TM flat gray clear bilaterally  Pulm: clear to auscultation bilaterally, no tachypnea  CV: RRR, no murmur  Ext: Warm, well perfused, no LE edema  Skin: No rash on limited skin exam  Psych: Mood appropriate to visit content, full affect, rational thought content and process      A/P:  1. Viral pharyngitis  -supportive cares, fluids, tea with honey, broth and motrin  - ibuprofen (ADVIL/MOTRIN) 600 MG tablet; Take 1 tablet (600 mg) by mouth every 6 hours as needed for moderate pain  Dispense: 90 tablet; Refill: 1  -call or follow up if not improving or develops fevers    Due for diabetes visit, asked to schedule today.     Suzan Alarcon MD  [facility] Medicine Resident  Pager     Precepted with: Johanny Vigil MD  "

## 2018-05-31 NOTE — MR AVS SNAPSHOT
After Visit Summary   5/31/2018    Jimmie Barrientos    MRN: [id number]           Patient Information     Date Of Birth          1975        Visit Information        Provider Department      5/31/2018 8:40 AM Suzan Alarcon MD [facility]        Today's Diagnoses     Viral pharyngitis    -  1      Care Instructions      Viral Pharyngitis (Sore Throat)    You or your child have pharyngitis (sore throat). This infection is caused by a virus. It can cause throat pain that is worse when swallowing, aching all over, headache, and fever. The infection may be spread by coughing, kissing, or touching others after touching your mouth or nose. Antibiotic medicines do not work against viruses. They are not used for treating this illness.  Home care    If symptoms are severe, you or your child should rest at home. Return to work or school when you or your child feel well enough.     You or your child should drink plenty of fluids to prevent dehydration.    Use throat lozenges or numbing throat sprays to help reduce pain. Gargling with warm salt water will also help reduce throat pain. Dissolve 1/2 teaspoon of salt in 1 glass of warm water. Children can sip on juice or a popsicle. Children 5 years and older can also suck on a lollipop or hard candy.    Don t eat salty or spicy foods or give them to your child. These can be irritating to the throat.  Medicines for a child: You can give your child acetaminophen for fever, fussiness, or discomfort. In babies over 6 months of age, you may use ibuprofen instead of acetaminophen. If your child has chronic liver or kidney disease or ever had a stomach ulcer or GI bleeding, talk with your child s healthcare provider before giving these medicines. Aspirin should never be used by any child under 18 years of age who has a fever. It may cause severe liver damage.  Medicines for an adult: You may use acetaminophen or ibuprofen to control pain or fever, unless another  medicine was prescribed for this. If you have chronic liver or kidney disease or ever had a stomach ulcer or GI bleeding, talk with your healthcare provider before using these medicines.  Follow-up care  Follow up with a healthcare provider or our staff if you or your child are not getting better over the next week.  When to seek medical advice  Call your healthcare provider right away if any of these occur:    Fever as directed by your healthcare provider.  For children, seek care if:  ? Your child is of any age and has repeated fevers above 104 F (40 C).  ? Your child is younger than 2 years of age and has a fever of 100.4 F (38 C) for more than 1 day.  ? Your child is 2 years old or older and has a fever of 100.4 F (38 C) for more than 3 days.    New or worsening ear pain, sinus pain, or headache    Painful lumps in the back of neck    Stiff neck    Lymph nodes are getting larger    Can t swallow liquids, a lot of drooling, or can t open mouth wide due to throat pain    Signs of dehydration, such as very dark urine or no urine, sunken eyes, dizziness    Trouble breathing or noisy breathing    Muffled voice    New rash    Other symptoms are getting worse  Date Last Reviewed: 10/1/2017    6519-7899 [employer]. [address] [place] [address], [place] [address]. All rights reserved. This information is not intended as a substitute for professional medical care. Always follow your healthcare professional's instructions.                Follow-ups after your visit        Follow-up notes from your care team     Return in about 4 weeks (around 6/28/2018).      Who to contact     Please call your clinic at [phone] to:    Ask questions about your health    Make or cancel appointments    Discuss your medicines    Learn about your test results    Speak to your doctor            Additional Information About Your Visit        Care EveryWhere ID     This is your Care EveryWhere ID. This could be used by other  "organizations to access your [place] medical records  [id number]        Your Vitals Were     Pulse Temperature Height Last Period Pulse Oximetry BMI (Body Mass Index)    68 98.2  F (36.8  C) (Oral) 4' 10.86\" (149.5 cm) 05/29/2018 99% 30.04 kg/m2       Blood Pressure from Last 3 Encounters:   05/31/18 103/71   02/14/18 116/82   11/28/17 112/76    Weight from Last 3 Encounters:   05/31/18 148 lb (67.1 kg)   02/14/18 149 lb (67.6 kg)   11/28/17 149 lb 3.2 oz (67.7 kg)              Today, you had the following     No orders found for display         Today's Medication Changes          These changes are accurate as of 5/31/18  9:15 AM.  If you have any questions, ask your nurse or doctor.               Start taking these medicines.        Dose/Directions    ibuprofen 600 MG tablet   Commonly known as:  ADVIL/MOTRIN   Used for:  Viral pharyngitis   Started by:  Suzan Alarcon MD        Dose:  600 mg   Take 1 tablet (600 mg) by mouth every 6 hours as needed for moderate pain   Quantity:  90 tablet   Refills:  1            Where to get your medicines      These medications were sent to [facility] - [place], [place] - [address] [place] [address]  [address], [place] [address]     Phone:  [phone]     ibuprofen 600 MG tablet                Primary Care Provider Office Phone # Fax #    Suzan Alarcon [phone]       [facility] [address] [place] [address]        Equal Access to Services     [facility] Center[facility] AH: Hadii jing francisco hadasho Sofreedom, waaxda luqadaha, qaybta kaalmada adeegyada, waxay idiin hayaan adeeg kharash la'aan . So [facility] [phone].    ATENCIÓN: Si habla español, tiene a lopez disposición servicios gratuitos de asistencia lingüística. Llame al [phone].    We comply with applicable federal civil rights laws and [place] laws. We do not discriminate on the basis of race, color, national origin, age, disability, sex, sexual orientation, or gender " identity.            Thank you!     Thank you for choosing [facility]  for your care. Our goal is always to provide you with excellent care. Hearing back from our patients is one way we can continue to improve our services. Please take a few minutes to complete the written survey that you may receive in the mail after your visit with us. Thank you!             Your Updated Medication List - Protect others around you: Learn how to safely use, store and throw away your medicines at www.disposemymeds.org.          This list is accurate as of 5/31/18  9:15 AM.  Always use your most recent med list.                   Brand Name Dispense Instructions for use Diagnosis    azelastine 0.05 % Soln ophthalmic solution    OPTIVAR    1 Bottle    Place 1 drop into the right eye 2 times daily    Chronic allergic conjunctivitis       cetirizine 10 MG tablet    zyrTEC    30 tablet    Take 1 tablet (10 mg) by mouth every evening    Chronic allergic conjunctivitis       ibuprofen 600 MG tablet    ADVIL/MOTRIN    90 tablet    Take 1 tablet (600 mg) by mouth every 6 hours as needed for moderate pain    Viral pharyngitis

## 2018-06-07 ENCOUNTER — OFFICE VISIT (OUTPATIENT)
Dept: FAMILY MEDICINE | Facility: CLINIC | Age: 43
End: 2018-06-07
Payer: COMMERCIAL

## 2018-06-07 VITALS
HEIGHT: 59 IN | OXYGEN SATURATION: 98 % | SYSTOLIC BLOOD PRESSURE: 112 MMHG | DIASTOLIC BLOOD PRESSURE: 72 MMHG | TEMPERATURE: 98.5 F | BODY MASS INDEX: 29.88 KG/M2 | WEIGHT: 148.2 LBS | HEART RATE: 72 BPM

## 2018-06-07 DIAGNOSIS — J01.90 ACUTE BACTERIAL SINUSITIS: Primary | ICD-10-CM

## 2018-06-07 DIAGNOSIS — B96.89 ACUTE BACTERIAL SINUSITIS: Primary | ICD-10-CM

## 2018-06-07 RX ORDER — OXYMETAZOLINE HYDROCHLORIDE 0.05 G/100ML
2-3 SPRAY NASAL 2 TIMES DAILY PRN
Qty: 1 BOTTLE | Refills: 0 | Status: SHIPPED | OUTPATIENT
Start: 2018-06-07 | End: 2019-09-12

## 2018-06-07 RX ORDER — AMOXICILLIN 500 MG/1
1000 CAPSULE ORAL 3 TIMES DAILY
Qty: 60 CAPSULE | Refills: 0 | Status: SHIPPED | OUTPATIENT
Start: 2018-06-07 | End: 2018-06-29

## 2018-06-07 ASSESSMENT — ENCOUNTER SYMPTOMS
CONSTITUTIONAL NEGATIVE: 1
SINUS PAIN: 1
CARDIOVASCULAR NEGATIVE: 1
EYES NEGATIVE: 1
RESPIRATORY NEGATIVE: 1
STRIDOR: 0
SORE THROAT: 1

## 2018-06-07 NOTE — MR AVS SNAPSHOT
After Visit Summary   6/7/2018    Jimmie Barrientos    MRN: [id number]           Patient Information     Date Of Birth          1975        Visit Information        Provider Department      6/7/2018 3:00 PM Troy Lopez MD [facility]        Today's Diagnoses     Acute bacterial sinusitis    -  1      Care Instructions    -  Start taking Amoxicillin 3 times a day for 10 days.  - Start taking afrin nasal spray for 3 days only. Use 2-3 sprays into each nostril 2 times daily.  - You should start feeling better in a couple days and feeling almost back to normal in 1 week.    Your medication list is printed, please keep this with you, it is helpful to bring this current list to any other medical appointments, the emergency room or hospital.    If you had lab testing today and your results are reassuring or normal they will be be mailed to you within 7 days.     If the lab tests need quick action we will call you with the results.   The phone number we will call with results is # [phone] (home) . If this is not the best number please call our clinic and change the number.    If you need any refills please call your pharmacy and they will contact us.    If you have any further concerns or wish to schedule another appointment you must call our office during normal business hours  [phone] (8-5:00 M-F)  If you have urgent medical questions that cannot wait  you may also call [phone] at any time of day.  If you have a medical emergency please call 911.    Thank you for coming to [facility].            Follow-ups after your visit        Your next 10 appointments already scheduled     Jun 22, 2018  1:40 PM CDT   PHYSICAL with Suzan Alarcon MD   [facility] ([facility])    [address] [facility] [address]   [phone]            Jun 29, 2018  1:40 PM CDT   Return Visit with Suzan Alarcon MD   [facility] ([facility])    [address]  [facility] [address]   [phone]              Who to contact     Please call your clinic at [phone] to:    Ask questions about your health    Make or cancel appointments    Discuss your medicines    Learn about your test results    Speak to your doctor            Additional Information About Your Visit        Care EveryWhere ID     This is your Care EveryWhere ID. This could be used by other organizations to access your [place] medical records  [id number]        Your Vitals Were     Pulse Temperature Height Last Period Pulse Oximetry BMI (Body Mass Index)    72 98.5  F (36.9  C) (Oral) 4' 10.75\" (149.2 cm) 05/29/2018 98% 30.19 kg/m2       Blood Pressure from Last 3 Encounters:   06/07/18 112/72   05/31/18 103/71   02/14/18 116/82    Weight from Last 3 Encounters:   06/07/18 148 lb 3.2 oz (67.2 kg)   05/31/18 148 lb (67.1 kg)   02/14/18 149 lb (67.6 kg)              Today, you had the following     No orders found for display         Today's Medication Changes          These changes are accurate as of 6/7/18  3:19 PM.  If you have any questions, ask your nurse or doctor.               Start taking these medicines.        Dose/Directions    amoxicillin 500 MG capsule   Commonly known as:  AMOXIL   Used for:  Acute bacterial sinusitis   Started by:  Troy Lopez MD        Dose:  1000 mg   Take 2 capsules (1,000 mg) by mouth 3 times daily   Quantity:  60 capsule   Refills:  0       oxymetazoline 0.05 % spray   Commonly known as:  AFRIN NASAL SPRAY   Used for:  Acute bacterial sinusitis   Started by:  Troy Lopez MD        Dose:  2-3 spray   Spray 2-3 sprays into both nostrils 2 times daily as needed for congestion   Quantity:  1 Bottle   Refills:  0            Where to get your medicines      These medications were sent to [facility] - [place], [place] - [address]  [address] [place] [address]     Phone:  [phone]     amoxicillin 500 MG " capsule    oxymetazoline 0.05 % spray                Primary Care Provider Office Phone # Fax #    Suzan Alarcon [phone]       [facility] [address] [place] [address]        Equal Access to Services     KRYSTINA BOSS : Hadnadir francisco allisono Soomaali, waaxda luqadaha, qaybta kaalmada adeegyada, santo katerynain hayaan ginacastillo blanc hal ramirez. So [facility] [phone].    ATENCIÓN: Si habla español, tiene a lopez disposición servicios gratuitos de asistencia lingüística. Llame al [phone].    We comply with applicable federal civil rights laws and [place] laws. We do not discriminate on the basis of race, color, national origin, age, disability, sex, sexual orientation, or gender identity.            Thank you!     Thank you for choosing [facility]  for your care. Our goal is always to provide you with excellent care. Hearing back from our patients is one way we can continue to improve our services. Please take a few minutes to complete the written survey that you may receive in the mail after your visit with us. Thank you!             Your Updated Medication List - Protect others around you: Learn how to safely use, store and throw away your medicines at www.disposemymeds.org.          This list is accurate as of 6/7/18  3:19 PM.  Always use your most recent med list.                   Brand Name Dispense Instructions for use Diagnosis    amoxicillin 500 MG capsule    AMOXIL    60 capsule    Take 2 capsules (1,000 mg) by mouth 3 times daily    Acute bacterial sinusitis       azelastine 0.05 % Soln ophthalmic solution    OPTIVAR    1 Bottle    Place 1 drop into the right eye 2 times daily    Chronic allergic conjunctivitis       cetirizine 10 MG tablet    zyrTEC    30 tablet    Take 1 tablet (10 mg) by mouth every evening    Chronic allergic conjunctivitis       ibuprofen 600 MG tablet    ADVIL/MOTRIN    90 tablet    Take 1 tablet (600 mg) by mouth every 6 hours as needed for  moderate pain    Viral pharyngitis       oxymetazoline 0.05 % spray    AFRIN NASAL SPRAY    1 Bottle    Spray 2-3 sprays into both nostrils 2 times daily as needed for congestion    Acute bacterial sinusitis

## 2018-06-07 NOTE — PATIENT INSTRUCTIONS
-  Start taking Amoxicillin 3 times a day for 10 days.  - Start taking afrin nasal spray for 3 days only. Use 2-3 sprays into each nostril 2 times daily.  - You should start feeling better in a couple days and feeling almost back to normal in 1 week.    Your medication list is printed, please keep this with you, it is helpful to bring this current list to any other medical appointments, the emergency room or hospital.    If you had lab testing today and your results are reassuring or normal they will be be mailed to you within 7 days.     If the lab tests need quick action we will call you with the results.   The phone number we will call with results is # [phone] (home) . If this is not the best number please call our clinic and change the number.    If you need any refills please call your pharmacy and they will contact us.    If you have any further concerns or wish to schedule another appointment you must call our office during normal business hours  [phone] (8-5:00 M-F)  If you have urgent medical questions that cannot wait  you may also call [phone] at any time of day.  If you have a medical emergency please call 335.    Thank you for coming to [facility].

## 2018-06-07 NOTE — PROGRESS NOTES
Assessment and Plan     1. Acute bacterial sinusitis  Discussed the natural history of the disease. Start amox. Discussed symptomatic cares and their uses/side effects. RTC if not improving. Answered all the patient's questions. Patient verbalized understanding.  - amoxicillin (AMOXIL) 500 MG capsule; Take 2 capsules (1,000 mg) by mouth 3 times daily  Dispense: 60 capsule; Refill: 0  - oxymetazoline (AFRIN NASAL SPRAY) 0.05 % spray; Spray 2-3 sprays into both nostrils 2 times daily as needed for congestion  Dispense: 1 Bottle; Refill: 0    Options for treatment and follow-up care were reviewed with the patient and/or guardian. Jimmie Barrientos and/or guardian engaged in the decision making process and verbalized understanding of the options discussed and agreed with the final plan. I answered all of their questions.    Troy Lopez III, MD, FAAFP  [facility] Faculty  06/07/18 3:20 PM           HPI:       Jimmie Barrientos is a 42 year old  female  Patient presents with:  Nasal Congestion  Throat Problem: sore throat    Has been having a congestion, sore throat, runny nose, itchy watery eyes, headache. Isn't any better than last week when she was seen. Denies SOB or chest pain.    Denies cough, fevers, chills, nausea, or vomiting.    A [employer]  was used for this visit         PMHX:     Patient Active Problem List   Diagnosis     Hepatitis B carrier (H)     Pap smear for cervical cancer screening     Overweight     Type 2 diabetes mellitus without complication, without long-term current use of insulin (H)     Urinary tract stones     Chronic allergic conjunctivitis       Current Outpatient Prescriptions   Medication Sig Dispense Refill     amoxicillin (AMOXIL) 500 MG capsule Take 2 capsules (1,000 mg) by mouth 3 times daily 60 capsule 0     oxymetazoline (AFRIN NASAL SPRAY) 0.05 % spray Spray 2-3 sprays into both nostrils 2 times daily as needed for congestion 1 Bottle 0     azelastine (OPTIVAR) 0.05 % SOLN  "ophthalmic solution Place 1 drop into the right eye 2 times daily (Patient not taking: Reported on 5/31/2018) 1 Bottle 1     cetirizine (ZYRTEC) 10 MG tablet Take 1 tablet (10 mg) by mouth every evening (Patient not taking: Reported on 5/31/2018) 30 tablet 1     ibuprofen (ADVIL/MOTRIN) 600 MG tablet Take 1 tablet (600 mg) by mouth every 6 hours as needed for moderate pain (Patient not taking: Reported on 6/7/2018) 90 tablet 1       Social History     Social History     Marital status:      Spouse name: N/A     Number of children: N/A     Years of education: N/A     Occupational History     Not on file.     Social History Main Topics     Smoking status: Never Smoker     Smokeless tobacco: Never Used     Alcohol use No     Drug use: No     Sexual activity: Not on file     Other Topics Concern     Not on file     Social History Narrative       Allergies   Allergen Reactions     No Known Allergies        No results found for this or any previous visit (from the past 24 hour(s)).         Review of Systems:     Review of Systems   Constitutional: Negative.    HENT: Positive for congestion, ear pain, sinus pain and sore throat. Negative for ear discharge, hearing loss and tinnitus.    Eyes: Negative.    Respiratory: Negative.  Negative for stridor.    Cardiovascular: Negative.    Skin: Negative.    All other systems reviewed and are negative.           Physical Exam:     Vitals:    06/07/18 1503   BP: 112/72   Pulse: 72   Temp: 98.5  F (36.9  C)   TempSrc: Oral   SpO2: 98%   Weight: 148 lb 3.2 oz (67.2 kg)   Height: 4' 10.75\" (149.2 cm)     Body mass index is 30.19 kg/(m^2).    Physical Exam   Constitutional: She is oriented to person, place, and time and well-developed, well-nourished, and in no distress. She appears to not be writhing in pain.  Non-toxic appearance. She does not have a sickly appearance.   HENT:   Right Ear: External ear and ear canal normal. No drainage. Tympanic membrane is not injected, not " retracted and not bulging. No middle ear effusion.   Left Ear: External ear and ear canal normal. No drainage. Tympanic membrane is not injected, not retracted and not bulging.  No middle ear effusion.   Nose: Mucosal edema (with erythema) and rhinorrhea present. No nose lacerations. No epistaxis. Right sinus exhibits no maxillary sinus tenderness and no frontal sinus tenderness. Left sinus exhibits frontal sinus tenderness. Left sinus exhibits no maxillary sinus tenderness.   Mouth/Throat: Mucous membranes are not dry and not cyanotic. Oropharyngeal exudate, posterior oropharyngeal edema and posterior oropharyngeal erythema present. No tonsillar abscesses.   Pulmonary/Chest: No respiratory distress.   Neurological: She is alert and oriented to person, place, and time. Gait normal. GCS score is 15.   Psychiatric: Affect normal.   Nursing note and vitals reviewed.

## 2018-06-29 ENCOUNTER — OFFICE VISIT (OUTPATIENT)
Dept: FAMILY MEDICINE | Facility: CLINIC | Age: 43
End: 2018-06-29
Payer: COMMERCIAL

## 2018-06-29 ENCOUNTER — RECORDS - HEALTHEAST (OUTPATIENT)
Dept: ADMINISTRATIVE | Facility: OTHER | Age: 43
End: 2018-06-29

## 2018-06-29 VITALS
HEIGHT: 58 IN | OXYGEN SATURATION: 98 % | BODY MASS INDEX: 30.98 KG/M2 | HEART RATE: 66 BPM | SYSTOLIC BLOOD PRESSURE: 117 MMHG | DIASTOLIC BLOOD PRESSURE: 81 MMHG | TEMPERATURE: 98 F | WEIGHT: 147.6 LBS

## 2018-06-29 DIAGNOSIS — E11.9 TYPE 2 DIABETES MELLITUS WITHOUT COMPLICATION, WITHOUT LONG-TERM CURRENT USE OF INSULIN (H): Primary | ICD-10-CM

## 2018-06-29 LAB — HBA1C MFR BLD: 6.5 % (ref 4.1–5.7)

## 2018-06-29 NOTE — PROGRESS NOTES
SUBJECTIVE:  Jimmie Barrientos is a 43 year old who presents today for follow up of DIABETES MELLITUS.         1.  Diabetes:  -since our last visit has started exercising  -has not made big diet changes  -has not seen diabetes educator  Problems taking medications regularly?   Not on DM meds  What are you doing for exercise outside of work or your daily activities? Walking on treadmill 3x per week  -mom has DM, on insulin and meds  -open to meeting with diabetes education    2. Sinus infection  Last visit was treated for sinusitis, has been feeling much better    Health maintenance reviewed and appropriate orders placed?  Yes      BP Readings from Last 3 Encounters:   06/29/18 117/81   06/07/18 112/72   05/31/18 103/71       Lab Results   Component Value Date    A1C 6.5 11/07/2017    A1C 5.9 09/20/2016    A1C 5.7 10/08/2012       Recent Labs   Lab Test  11/28/17   0906  10/25/16   1210   CHOL  140  150   HDL  44*  49*   LDL  72  86       Wt Readings from Last 3 Encounters:   06/29/18 147 lb 9.6 oz (67 kg)   06/07/18 148 lb 3.2 oz (67.2 kg)   05/31/18 148 lb (67.1 kg)       Current Outpatient Prescriptions   Medication Sig Dispense Refill     amoxicillin (AMOXIL) 500 MG capsule Take 2 capsules (1,000 mg) by mouth 3 times daily 60 capsule 0     azelastine (OPTIVAR) 0.05 % SOLN ophthalmic solution Place 1 drop into the right eye 2 times daily 1 Bottle 1     ibuprofen (ADVIL/MOTRIN) 600 MG tablet Take 1 tablet (600 mg) by mouth every 6 hours as needed for moderate pain 90 tablet 1     cetirizine (ZYRTEC) 10 MG tablet Take 1 tablet (10 mg) by mouth every evening (Patient not taking: Reported on 5/31/2018) 30 tablet 1     oxymetazoline (AFRIN NASAL SPRAY) 0.05 % spray Spray 2-3 sprays into both nostrils 2 times daily as needed for congestion (Patient not taking: Reported on 6/29/2018) 1 Bottle 0       Histories reviewed and updated in Epic.      ROS:  C: NEGATIVE for fatigue, unexpected change in weight  E: NEGATIVE for acute  "vision problems or changes  R: NEGATIVE for significant cough or shortness of breath  CV: NEGATIVE for chest pain, palpitations or new or worsening peripheral edema  P: NEGATIVE for changes in mood or affect    EXAM:  Vitals: /81 (BP Location: Left arm, Patient Position: Sitting, Cuff Size: Adult Regular)  Pulse 66  Temp 98  F (36.7  C) (Oral)  Ht 4' 10.47\" (148.5 cm)  Wt 147 lb 9.6 oz (67 kg)  SpO2 98%  BMI 30.36 kg/m2  BMIE= Body mass index is 30.36 kg/(m^2).  GENERAL APPEARANCE: alert and no acute distress  PSYCH: mentation appears normal and affect normal/bright  RESP: lungs clear to auscultation - no rales, rhonchi or wheezes  CV: regular rate and rhythm, normal S1 S2, no S3 or S4 and no murmur, click or rub -  EXT: no cyanosis or edema in lower extremities  SKIN: no venous stasis changes  Foot Exam: normal DP and PT pulses, no trophic changes or ulcerative lesions and normal sensory exam    ASSESSMENT AND PLAN:  1. Type 2 diabetes mellitus without complication, without long-term current use of insulin (H)  New diagnosis, starting metformin. Follow up in 2 weeks.   - Hemoglobin A1c ([facility])  - metFORMIN (GLUCOPHAGE) 500 MG tablet; Take 1 tablet (500 mg) by mouth 2 times daily (with meals)  Dispense: 180 tablet; Refill: 3  - DIABETES EDUCATOR REFERRAL; Future    The 10-year ASCVD risk score (Misty DC Jr, et al., 2013) is: 0.8%    Values used to calculate the score:      Age: 43 years      Sex: Female      Is Non- : No      Diabetic: Yes      Tobacco smoker: No      Systolic Blood Pressure: 117 mmHg      Is BP treated: No      HDL Cholesterol: 44 mg/dL      Total Cholesterol: 140 mg/dL   Will not start statin today, recheck lipids in 1 year.     Suzan Alarcon    Precepted with: Eliza Anderson MD      "

## 2018-06-29 NOTE — PATIENT INSTRUCTIONS
Thanks for coming in today Jimmie Barrientos!    Topics discussed this visit:   1. Type 2 diabetes mellitus without complication, without long-term current use of insulin (H)  -start metformin  Week 1: One tab in the morning  Week 2: One tab in the morning and one at night  Week 3: Two tabs in the morning and one at night  Week 4: Two tabs in the morning and two at night    - Hemoglobin A1c ([facility])  - metFORMIN (GLUCOPHAGE) 500 MG tablet; Take 1 tablet (500 mg) by mouth 2 times daily (with meals)  Dispense: 180 tablet; Refill: 3  - DIABETES EDUCATOR REFERRAL; Future          Follow up in 2 weeks      Your medication list is printed, please keep this with you, it is helpful to bring this current list to any other medical appointments, the emergency room or hospital.    If you had lab testing today and your results are reassuring or normal they will be be mailed to you within 7 days.     If the lab tests need quick action we will call you with the results.   The phone number we will call with results is # [phone] (home) . If this is not the best number please call our clinic and change the number.    If you need any refills please call your pharmacy and they will contact us.    If you have any further concerns or wish to schedule another appointment you must call our office during normal business hours  [phone] (8-5:00 M-F)  If you have urgent medical questions that cannot wait  you may also call [phone] at any time of day.  If you have a medical emergency please call [phone].    Thank you for coming to [facility].      Referral for Diabetes Educator along with OV notes and labs faxed to  [facility]  [phone]  [phone]  Clinic will contact patient to schedule appointment.

## 2018-06-29 NOTE — MR AVS SNAPSHOT
After Visit Summary   6/29/2018    Jimmie Barrientos    MRN: [id number]           Patient Information     Date Of Birth          1975        Visit Information        Provider Department      6/29/2018 1:40 PM Suzan Alarcon MD [facility]        Today's Diagnoses     Type 2 diabetes mellitus without complication, without long-term current use of insulin (H)    -  1      Care Instructions    Thanks for coming in today Jimmie Barrientos!    Topics discussed this visit:   1. Type 2 diabetes mellitus without complication, without long-term current use of insulin (H)  -start metformin  Week 1: One tab in the morning  Week 2: One tab in the morning and one at night  Week 3: Two tabs in the morning and one at night  Week 4: Two tabs in the morning and two at night    - Hemoglobin A1c ([facility])  - metFORMIN (GLUCOPHAGE) 500 MG tablet; Take 1 tablet (500 mg) by mouth 2 times daily (with meals)  Dispense: 180 tablet; Refill: 3  - DIABETES EDUCATOR REFERRAL; Future          Follow up in 2 weeks      Your medication list is printed, please keep this with you, it is helpful to bring this current list to any other medical appointments, the emergency room or hospital.    If you had lab testing today and your results are reassuring or normal they will be be mailed to you within 7 days.     If the lab tests need quick action we will call you with the results.   The phone number we will call with results is # [phone] (home) . If this is not the best number please call our clinic and change the number.    If you need any refills please call your pharmacy and they will contact us.    If you have any further concerns or wish to schedule another appointment you must call our office during normal business hours  [phone] (8-5:00 M-F)  If you have urgent medical questions that cannot wait  you may also call [phone] at any time of day.  If you have a medical emergency please call 555.    Thank you for coming to  [facility].            Follow-ups after your visit        Additional Services     DIABETES EDUCATOR REFERRAL       New Diagnosis    Diabetes Education Order:  Choose either self management, prediabetes,  one on one, endocrine consult, GDM, pump therapy or exanitide start one-one self management   Diabetes Self Management Class  One on One with Diabetes Educator:  Medical Nutrition Therapy   Insulin Start or Adjust:                        New to insulin?  Not on insulin                        RN to calculate and adjust insulin?                           Patient to continue oral medicine?  Yes       needed: Yes  Language: Hmong    Recent labs including A1C, Lipid panel, FBS or casual glucose or GCT:    Lab Results       Component                Value               Date                       A1C                      6.5                 06/29/2018                 A1C                      6.5                 11/07/2017            Lab Results       Component                Value               Date                       CHOL                     140                 11/28/2017                 CHOL                     150                 10/25/2016            Lab Results       Component                Value               Date                       HDL                      44                  11/28/2017                 HDL                      49                  10/25/2016            Lab Results       Component                Value               Date                       LDL                      72                  11/28/2017                 LDL                      86                  10/25/2016            No results found for: TRIG  No results found for: CHOLHDLRATIO  Lab Results       Component                Value               Date                       GLC                      128                 11/28/2017                 GLC                      228.0               11/07/2017           "]    ([facility] Only) Referral should be tracked (Yes/No)? Yes                  Future tests that were ordered for you today     Open Future Orders        Priority Expected Expires Ordered    DIABETES EDUCATOR REFERRAL Routine  6/29/2019 6/29/2018            Who to contact     Please call your clinic at [phone] to:    Ask questions about your health    Make or cancel appointments    Discuss your medicines    Learn about your test results    Speak to your doctor            Additional Information About Your Visit        Care EveryWhere ID     This is your Care EveryWhere ID. This could be used by other organizations to access your [place] medical records  [id number]        Your Vitals Were     Pulse Temperature Height Pulse Oximetry BMI (Body Mass Index)       66 98  F (36.7  C) (Oral) 4' 10.47\" (148.5 cm) 98% 30.36 kg/m2        Blood Pressure from Last 3 Encounters:   06/29/18 117/81   06/07/18 112/72   05/31/18 103/71    Weight from Last 3 Encounters:   06/29/18 147 lb 9.6 oz (67 kg)   06/07/18 148 lb 3.2 oz (67.2 kg)   05/31/18 148 lb (67.1 kg)              We Performed the Following     Hemoglobin A1c ([facility])          Today's Medication Changes          These changes are accurate as of 6/29/18  2:49 PM.  If you have any questions, ask your nurse or doctor.               Start taking these medicines.        Dose/Directions    metFORMIN 500 MG tablet   Commonly known as:  GLUCOPHAGE   Used for:  Type 2 diabetes mellitus without complication, without long-term current use of insulin (H)   Started by:  Suzan Alarcon MD        Dose:  500 mg   Take 1 tablet (500 mg) by mouth 2 times daily (with meals)   Quantity:  180 tablet   Refills:  3         Stop taking these medicines if you haven't already. Please contact your care team if you have questions.     amoxicillin 500 MG capsule   Commonly known as:  AMOXIL   Stopped by:  Suzan Alarcon MD                Where to get your medicines      These medications were " sent to [facility] - [place], [place] - [address]  [address], [place] [address]     Phone:  [phone]     metFORMIN 500 MG tablet                Primary Care Provider Office Phone # Fax #    Suzan Alarcon [phone]       [facility] [address] [place] [address]        Equal Access to Services     KRYSTINA BOSS : Hadii aad ku hadasho Soomaali, waaxda luqadaha, qaybta kaalmada adeegyada, waxay idiin hayaan adeeg khhardeepsh la'gayn . So [facility] [phone].    ATENCIÓN: Si habla español, tiene a lopez disposición servicios gratuitos de asistencia lingüística. Eriame al [phone].    We comply with applicable federal civil rights laws and [place] laws. We do not discriminate on the basis of race, color, national origin, age, disability, sex, sexual orientation, or gender identity.            Thank you!     Thank you for choosing [facility]  for your care. Our goal is always to provide you with excellent care. Hearing back from our patients is one way we can continue to improve our services. Please take a few minutes to complete the written survey that you may receive in the mail after your visit with us. Thank you!             Your Updated Medication List - Protect others around you: Learn how to safely use, store and throw away your medicines at www.disposemymeds.org.          This list is accurate as of 6/29/18  2:49 PM.  Always use your most recent med list.                   Brand Name Dispense Instructions for use Diagnosis    azelastine 0.05 % Soln ophthalmic solution    OPTIVAR    1 Bottle    Place 1 drop into the right eye 2 times daily    Chronic allergic conjunctivitis       cetirizine 10 MG tablet    zyrTEC    30 tablet    Take 1 tablet (10 mg) by mouth every evening    Chronic allergic conjunctivitis       ibuprofen 600 MG tablet    ADVIL/MOTRIN    90 tablet    Take 1 tablet (600 mg) by mouth every 6 hours as needed for  moderate pain    Viral pharyngitis       metFORMIN 500 MG tablet    GLUCOPHAGE    180 tablet    Take 1 tablet (500 mg) by mouth 2 times daily (with meals)    Type 2 diabetes mellitus without complication, without long-term current use of insulin (H)       oxymetazoline 0.05 % spray    AFRIN NASAL SPRAY    1 Bottle    Spray 2-3 sprays into both nostrils 2 times daily as needed for congestion    Acute bacterial sinusitis

## 2018-06-29 NOTE — NURSING NOTE
Offered Pt. PCV vaccine but patient declined.    Due to patient being non-English speaking/uses sign language, an  was used for this visit. Only for face-to-face interpretation by an external agency, date and length of interpretation can be found on the scanned worksheet.     name: Debbie  Agency: [employer]  Language: horace   Telephone number: [phone]  Type of interpretation: Face-to-face, spoken

## 2018-07-02 ENCOUNTER — COMMUNICATION - HEALTHEAST (OUTPATIENT)
Dept: ADMINISTRATIVE | Facility: CLINIC | Age: 43
End: 2018-07-02

## 2018-07-12 ENCOUNTER — OFFICE VISIT (OUTPATIENT)
Dept: FAMILY MEDICINE | Facility: CLINIC | Age: 43
End: 2018-07-12
Payer: COMMERCIAL

## 2018-07-12 VITALS
BODY MASS INDEX: 30.81 KG/M2 | SYSTOLIC BLOOD PRESSURE: 98 MMHG | DIASTOLIC BLOOD PRESSURE: 68 MMHG | OXYGEN SATURATION: 98 % | WEIGHT: 149.8 LBS | TEMPERATURE: 98 F | HEART RATE: 71 BPM

## 2018-07-12 DIAGNOSIS — E11.9 TYPE 2 DIABETES MELLITUS WITHOUT COMPLICATION, WITHOUT LONG-TERM CURRENT USE OF INSULIN (H): Primary | ICD-10-CM

## 2018-07-12 NOTE — MR AVS SNAPSHOT
After Visit Summary   7/12/2018    Jimmie Barrientos    MRN: [id number]           Patient Information     Date Of Birth          1975        Visit Information        Provider Department      7/12/2018 9:20 AM Suzan Alarcon MD [facility]        Today's Diagnoses     Type 2 diabetes mellitus without complication, without long-term current use of insulin (H)    -  1       Follow-ups after your visit        Follow-up notes from your care team     Return in about 3 months (around 10/12/2018).      Who to contact     Please call your clinic at [phone] to:    Ask questions about your health    Make or cancel appointments    Discuss your medicines    Learn about your test results    Speak to your doctor            Additional Information About Your Visit        Care EveryWhere ID     This is your Care EveryWhere ID. This could be used by other organizations to access your [place] medical records  [id number]        Your Vitals Were     Pulse Temperature Pulse Oximetry BMI (Body Mass Index)          71 98  F (36.7  C) (Oral) 98% 30.81 kg/m2         Blood Pressure from Last 3 Encounters:   07/12/18 98/68   06/29/18 117/81   06/07/18 112/72    Weight from Last 3 Encounters:   07/12/18 149 lb 12.8 oz (67.9 kg)   06/29/18 147 lb 9.6 oz (67 kg)   06/07/18 148 lb 3.2 oz (67.2 kg)              Today, you had the following     No orders found for display       Primary Care Provider Office Phone # Fax #    Suzan Alarcon [phone]       [facility] [address] [place] [address]        Equal Access to Services     VELASQUEZ [facility] HospitalRUKHSANA AH: Hadii aad ku hadasho Soomaali, waaxda luqadaha, qaybta kaalmada adeegyada, santo hong . So [facility] [phone].    ATENCIÓN: Si habla español, tiene a lopez disposición servicios gratuitos de asistencia lingüística. Llame al [phone].    We comply with applicable federal civil rights laws and [place] laws. We do not  discriminate on the basis of race, color, national origin, age, disability, sex, sexual orientation, or gender identity.            Thank you!     Thank you for choosing [facility]  for your care. Our goal is always to provide you with excellent care. Hearing back from our patients is one way we can continue to improve our services. Please take a few minutes to complete the written survey that you may receive in the mail after your visit with us. Thank you!             Your Updated Medication List - Protect others around you: Learn how to safely use, store and throw away your medicines at www.disposemymeds.org.          This list is accurate as of 7/12/18 11:59 PM.  Always use your most recent med list.                   Brand Name Dispense Instructions for use Diagnosis    azelastine 0.05 % Soln ophthalmic solution    OPTIVAR    1 Bottle    Place 1 drop into the right eye 2 times daily    Chronic allergic conjunctivitis       cetirizine 10 MG tablet    zyrTEC    30 tablet    Take 1 tablet (10 mg) by mouth every evening    Chronic allergic conjunctivitis       ibuprofen 600 MG tablet    ADVIL/MOTRIN    90 tablet    Take 1 tablet (600 mg) by mouth every 6 hours as needed for moderate pain    Viral pharyngitis       metFORMIN 500 MG tablet    GLUCOPHAGE    180 tablet    Take 1 tablet (500 mg) by mouth 2 times daily (with meals)    Type 2 diabetes mellitus without complication, without long-term current use of insulin (H)       oxymetazoline 0.05 % spray    AFRIN NASAL SPRAY    1 Bottle    Spray 2-3 sprays into both nostrils 2 times daily as needed for congestion    Acute bacterial sinusitis

## 2018-07-12 NOTE — NURSING NOTE
Due to patient being non-English speaking/uses sign language, an  was used for this visit. Only for face-to-face interpretation by an external agency, date and length of interpretation can be found on the scanned worksheet.     name: Kaylie  Agency: [employer]  Language: Alexander   Telephone number: [phone]  Type of interpretation: Face-to-face, spoken

## 2018-07-12 NOTE — PROGRESS NOTES
"SUBJECTIVE:  Jimmie Barrientos is a 43 year old who presents today for follow up of DIABETES MELLITUS.              Recently stared on metformin 2 weeks ago and following up to see how it is going.     1.  Diabetes:  Patient glucose self monitoring: No  Symptoms of low blood sugar (hypoglycemia:sweating, shaky, weak, dizzy, blurred vision, confusion)? None  Problems taking medications regularly? Metformin 500 mg, BID  Side effects? \"different feeling in stomach\", excess saliva; not so severe that it affects appetite   What are you doing for exercise outside of work or your daily activities?   2-3x per week walking outside 30-60 minutes a time   Diet: rice, meat, vegetable, water   Juice: small can 3-4x per week   Patient concerns: None    Health maintenance reviewed and appropriate orders placed?  Needs eye referral but will wait as DM educator visit not yet      BP Readings from Last 3 Encounters:   06/29/18 117/81   06/07/18 112/72   05/31/18 103/71       Lab Results   Component Value Date    A1C 6.5 06/29/2018    A1C 6.5 11/07/2017    A1C 5.9 09/20/2016       Recent Labs   Lab Test  11/28/17   0906  10/25/16   1210   CHOL  140  150   HDL  44*  49*   LDL  72  86       Wt Readings from Last 3 Encounters:   06/29/18 147 lb 9.6 oz (67 kg)   06/07/18 148 lb 3.2 oz (67.2 kg)   05/31/18 148 lb (67.1 kg)       Current Outpatient Prescriptions   Medication Sig Dispense Refill     azelastine (OPTIVAR) 0.05 % SOLN ophthalmic solution Place 1 drop into the right eye 2 times daily 1 Bottle 1     cetirizine (ZYRTEC) 10 MG tablet Take 1 tablet (10 mg) by mouth every evening (Patient not taking: Reported on 5/31/2018) 30 tablet 1     ibuprofen (ADVIL/MOTRIN) 600 MG tablet Take 1 tablet (600 mg) by mouth every 6 hours as needed for moderate pain 90 tablet 1     metFORMIN (GLUCOPHAGE) 500 MG tablet Take 1 tablet (500 mg) by mouth 2 times daily (with meals) 180 tablet 3     oxymetazoline (AFRIN NASAL SPRAY) 0.05 % spray Spray 2-3 sprays " into both nostrils 2 times daily as needed for congestion (Patient not taking: Reported on 6/29/2018) 1 Bottle 0       Histories reviewed and updated in Epic.      ROS:  C: NEGATIVE for fatigue, unexpected change in weight  E: NEGATIVE for acute vision problems or changes  R: NEGATIVE for significant cough or shortness of breath  CV: NEGATIVE for chest pain, palpitations or new or worsening peripheral edema  P: NEGATIVE for changes in mood or affect    EXAM:  Vitals: BP 98/68  Pulse 71  Temp 98  F (36.7  C) (Oral)  Wt 149 lb 12.8 oz (67.9 kg)  SpO2 98%  BMI 30.81 kg/m2  BMIE= There is no height or weight on file to calculate BMI.  GENERAL APPEARANCE: alert and no acute distress  PSYCH: mentation appears normal and affect normal/bright  RESP: lungs clear to auscultation - no rales, rhonchi or wheezes  CV: regular rate and rhythm, normal S1 S2, no S3 or S4 and no murmur, click or rub -  EXT: no cyanosis or edema in lower extremities  SKIN: no venous stasis changes  Foot Exam: no    ASSESSMENT AND PLAN:  The 10-year ASCVD risk score ([place] DC Jr, et al., 2013) is: 0.6%    Values used to calculate the score:      Age: 43 years      Sex: Female      Is Non- : No      Diabetic: Yes      Tobacco smoker: No      Systolic Blood Pressure: 98 mmHg      Is BP treated: No      HDL Cholesterol: 44 mg/dL      Total Cholesterol: 140 mg/dL       1. Type 2 diabetes mellitus without complication, without long-term current use of insulin (H)  Diabetes well controlled, tolerating metformin initiation. Will need ASA and statin in the future, discussed ASA today and patient does not want to start another medication at this time. Will re-address in the future.   -DM education, referral in, will have her meet with Mariana and schedule today  -follow up in 3 months       Suzan Alarcon    Precepted with: Johanny Vigil MD

## 2018-12-18 ENCOUNTER — OFFICE VISIT (OUTPATIENT)
Dept: FAMILY MEDICINE | Facility: CLINIC | Age: 43
End: 2018-12-18
Payer: COMMERCIAL

## 2018-12-18 VITALS
SYSTOLIC BLOOD PRESSURE: 111 MMHG | BODY MASS INDEX: 30.4 KG/M2 | RESPIRATION RATE: 18 BRPM | OXYGEN SATURATION: 99 % | TEMPERATURE: 98.5 F | WEIGHT: 150.8 LBS | DIASTOLIC BLOOD PRESSURE: 75 MMHG | HEART RATE: 72 BPM | HEIGHT: 59 IN

## 2018-12-18 DIAGNOSIS — H57.89 EYE IRRITATION: Primary | ICD-10-CM

## 2018-12-18 ASSESSMENT — MIFFLIN-ST. JEOR: SCORE: 1250.52

## 2018-12-18 NOTE — PROGRESS NOTES
Preceptor Attestation:   Patient seen, evaluated and discussed with the resident. I have verified the content of the note, which accurately reflects my assessment of the patient and the plan of care.  Supervising Physician:Mary Taylor MD  [facility]

## 2018-12-18 NOTE — PATIENT INSTRUCTIONS
Referral for ( TEST )  :      Ophthalmology   [place]    [facility] [place] :     1-3-2019 at 2:15  PHONE :     [phone]  FAX :     [phone]  Appointment made by clinic staff/:    Mariana

## 2018-12-18 NOTE — NURSING NOTE
Pt. Declined Flu Shot    Due to patient being non-English speaking/uses sign language, an  was used for this visit. Only for face-to-face interpretation by an external agency, date and length of interpretation can be found on the scanned worksheet.     name: Erica Duval  Agency: [employer]  Language: Hmong   Telephone number: [phone]  Type of interpretation: Face-to-face, spoken

## 2018-12-18 NOTE — PROGRESS NOTES
HPI:       Jimmie Barrientos is a 43 year old  Female with chronic eye watering who presents to follow up for     1. Right Eye Pain  Describes the eye pain as itching and aching that has happened for years, that comes and goes in episodes. In the last 2-3 months the eye itch/pain has been more consistent and bothersome. Was seen previously at [facility] and was diagnosed with chronic allergic conjunctivitis and received azelastine and cetirizine and took them for a few weeks but didn't notice any improvement of symptoms. Also denies any eczema, pets, plants, flowers or other potential allergic sources. States only the right eye is bothersome, the left eye has no problems. Denies cough, runny nose, vision changes, headaches, shortness of breath, dysuria, hematuria or chest pain.     A [employer]  was used for this visit         PMHX:     Patient Active Problem List   Diagnosis     Hepatitis B carrier (H)     Pap smear for cervical cancer screening     Overweight     Type 2 diabetes mellitus without complication, without long-term current use of insulin (H)     Urinary tract stones     Chronic allergic conjunctivitis       Current Outpatient Medications   Medication Sig Dispense Refill     azelastine (OPTIVAR) 0.05 % SOLN ophthalmic solution Place 1 drop into the right eye 2 times daily 1 Bottle 1     cetirizine (ZYRTEC) 10 MG tablet Take 1 tablet (10 mg) by mouth every evening (Patient not taking: Reported on 5/31/2018) 30 tablet 1     ibuprofen (ADVIL/MOTRIN) 600 MG tablet Take 1 tablet (600 mg) by mouth every 6 hours as needed for moderate pain 90 tablet 1     metFORMIN (GLUCOPHAGE) 500 MG tablet Take 1 tablet (500 mg) by mouth 2 times daily (with meals) 180 tablet 3     oxymetazoline (AFRIN NASAL SPRAY) 0.05 % spray Spray 2-3 sprays into both nostrils 2 times daily as needed for congestion (Patient not taking: Reported on 6/29/2018) 1 Bottle 0       Social History     Socioeconomic History     Marital status:       Spouse name: Not on file     Number of children: Not on file     Years of education: Not on file     Highest education level: Not on file   Social Needs     Financial resource strain: Not on file     Food insecurity - worry: Not on file     Food insecurity - inability: Not on file     Transportation needs - medical: Not on file     Transportation needs - non-medical: Not on file   Occupational History     Not on file   Tobacco Use     Smoking status: Never Smoker     Smokeless tobacco: Never Used   Substance and Sexual Activity     Alcohol use: No     Alcohol/week: 0.0 oz     Drug use: No     Sexual activity: Not on file   Other Topics Concern     Parent/sibling w/ CABG, MI or angioplasty before 65F 55M? Not Asked   Social History Narrative     Not on file       Family History   Problem Relation Age of Onset     Family History Negative Other      Diabetes Mother      Coronary Artery Disease No family hx of      Breast Cancer No family hx of      Colon Cancer No family hx of      Prostate Cancer No family hx of      Other Cancer No family hx of           Allergies   Allergen Reactions     No Known Allergies        No results found for this or any previous visit (from the past 24 hour(s)).         Review of Systems:   Pertinent review of systems in HPI, all others negative.          Physical Exam:     There were no vitals filed for this visit.  There is no height or weight on file to calculate BMI.    GENERAL APPEARANCE: healthy, alert and no distress,  EYES: Eyes grossly normal to inspection, EOM intact, PERRL  HENT: ear canals and TM's normal and nose and mouth without ulcers or lesions  NECK: no adenopathy, no asymmetry, masses, or scars and thyroid normal to palpation  RESP: lungs clear to auscultation - no rales, rhonchi or wheezes  CV: regular rate and rhythm,  and no murmur, click,  rub or gallop  ABDOMEN: soft, nontender, without hepatosplenomegaly or masses  MS: extremities normal- no gross  deformities noted  SKIN: no suspicious lesions or rashes  NEURO: Normal strength and tone, sensory exam grossly normal, mentation appears intact and speech normal  PSYCH: mood and affect normal/bright      Assessment and Plan     1. Eye irritation  (primary encounter diagnosis)  Given unilateral symptoms and previous anti-histamine treatment patient symptoms do not seem to be caused by allergic exposure. Given the chronic history does no suggest a foreign body irritant is the cause of patient's symptoms. Differential for patient's acute on chronic eye irritation include open-angle glaucoma, keratitis. Eye drops can be used for symptomatic relief and patient is referred for intra-ocular pressure measurement.  -carboxymethylcellulose (CELLUVISC/REFRESH LIQUIGEL) 1 % ophthalmic solution  - OPHTHALMOLOGY ADULT REFERRAL,     Options for treatment and follow-up care were reviewed with the patient and/or guardian. Jimmie Barrientos and/or guardian engaged in the decision making process and verbalized understanding of the options discussed and agreed with the final plan.    Marvin Loera, MS3  [facility]    Resident/Fellow Attestation   I, Manfred Donahue, was present with the medical student who participated in the service and in the documentation of the note.  I have verified the history and personally performed the physical exam and medical decision making.  I agree with the assessment and plan of care as documented in the note.      Manfred Donahue MD  PGY2      Precepted today with: Dr. Mary Taylor

## 2019-03-22 ENCOUNTER — TELEPHONE (OUTPATIENT)
Dept: FAMILY MEDICINE | Facility: CLINIC | Age: 44
End: 2019-03-22

## 2019-03-22 NOTE — TELEPHONE ENCOUNTER
Called Jimmie Barrientos with  ID#[id number] to schedule DM visit. Pt is now scheduled for 3/28/2019.      Juliann Rabago, CMA

## 2019-08-22 ENCOUNTER — TELEPHONE (OUTPATIENT)
Dept: FAMILY MEDICINE | Facility: CLINIC | Age: 44
End: 2019-08-22

## 2019-08-22 NOTE — TELEPHONE ENCOUNTER
Called Jimmie Barrientos with  ID#[id number] to schedule DM visit. Scheduled 9/12/19.    Juliann Rabago, CMA

## 2019-09-12 ENCOUNTER — OFFICE VISIT (OUTPATIENT)
Dept: FAMILY MEDICINE | Facility: CLINIC | Age: 44
End: 2019-09-12
Payer: COMMERCIAL

## 2019-09-12 VITALS
HEIGHT: 59 IN | BODY MASS INDEX: 30.88 KG/M2 | SYSTOLIC BLOOD PRESSURE: 120 MMHG | HEART RATE: 75 BPM | WEIGHT: 153.2 LBS | OXYGEN SATURATION: 99 % | TEMPERATURE: 98.1 F | DIASTOLIC BLOOD PRESSURE: 78 MMHG

## 2019-09-12 DIAGNOSIS — E66.09 CLASS 1 OBESITY DUE TO EXCESS CALORIES WITHOUT SERIOUS COMORBIDITY WITH BODY MASS INDEX (BMI) OF 31.0 TO 31.9 IN ADULT: ICD-10-CM

## 2019-09-12 DIAGNOSIS — E66.811 CLASS 1 OBESITY DUE TO EXCESS CALORIES WITHOUT SERIOUS COMORBIDITY WITH BODY MASS INDEX (BMI) OF 31.0 TO 31.9 IN ADULT: ICD-10-CM

## 2019-09-12 DIAGNOSIS — E11.9 TYPE 2 DIABETES MELLITUS WITHOUT COMPLICATION, WITHOUT LONG-TERM CURRENT USE OF INSULIN (H): Primary | ICD-10-CM

## 2019-09-12 DIAGNOSIS — Z13.9 SCREENING FOR CONDITION: ICD-10-CM

## 2019-09-12 LAB
BUN SERPL-MCNC: 10 MG/DL (ref 5–24)
CALCIUM SERPL-MCNC: 8.9 MG/DL (ref 8.5–10.4)
CHLORIDE SERPLBLD-SCNC: 103 MMOL/L (ref 94–109)
CHOLEST SERPL-MCNC: 143 MG/DL
CHOLEST/HDLC SERPL: 2.7 RATIO
CO2 SERPL-SCNC: 27 MMOL/L (ref 20–32)
CREAT SERPL-MCNC: 0.5 MG/DL (ref 0.6–1.3)
EGFR CALCULATED (BLACK REFERENCE): >90 ML/MIN
EGFR CALCULATED (NON BLACK REFERENCE): >90 ML/MIN
GLUCOSE SERPL-MCNC: 152 MG/DL (ref 60–109)
HBA1C MFR BLD: 6.6 % (ref 4.1–5.7)
HDLC SERPL-MCNC: 52 MG/DL
LDLC SERPL CALC-MCNC: 71 MG/DL (ref 0–99)
POTASSIUM SERPL-SCNC: 3.2 MMOL/L (ref 3.4–5.3)
SODIUM SERPL-SCNC: 140 MMOL/L (ref 133–144)
TRIGL SERPL-MCNC: 100 MG/DL
VLDL-CHOLESTEROL: 20 MG/DL (ref 7–32)

## 2019-09-12 SDOH — HEALTH STABILITY: PHYSICAL HEALTH: ON AVERAGE, HOW MANY DAYS PER WEEK DO YOU ENGAGE IN MODERATE TO STRENUOUS EXERCISE (LIKE A BRISK WALK)?: 4 DAYS

## 2019-09-12 ASSESSMENT — MIFFLIN-ST. JEOR: SCORE: 1250.15

## 2019-09-12 NOTE — PATIENT INSTRUCTIONS
Follow up in 3 months for diabetes check   Switch from white rice to brown rice, decrease amount of rice and increase amount veggies   Exercise: 30 minutes every day so that your heart rate increase

## 2019-09-12 NOTE — PROGRESS NOTES
HPI:       Jimmie Barrientos is a 44 year old  female with PMH significant for type 2 DM who presents for:      1. DM follow up  - med: none  - last A1c: 6.5 in June 2018, today is 6.6  - diet: no breakfast. Lunch: rice, veggies, meat. Dinner: same   - no snacks, no sweets or desserts, no pop or juice  - concerns: none  - tries to exercise every other day by being active outside for at least: un able to quantify how much    - no tobacco or alcohol use   - does not want medication     A [employer]  was used for this visit         PMHX:     Patient Active Problem List   Diagnosis     Hepatitis B carrier (H)     Pap smear for cervical cancer screening     Overweight     Type 2 diabetes mellitus without complication, without long-term current use of insulin (H)     Urinary tract stones     Chronic allergic conjunctivitis       Current Outpatient Medications   Medication Sig Dispense Refill     azelastine (OPTIVAR) 0.05 % SOLN ophthalmic solution Place 1 drop into the right eye 2 times daily 1 Bottle 1     carboxymethylcellulose (CELLUVISC/REFRESH LIQUIGEL) 1 % ophthalmic solution Apply 1 drop to eye daily as needed for dry eyes 15 mL 0     cetirizine (ZYRTEC) 10 MG tablet Take 1 tablet (10 mg) by mouth every evening (Patient not taking: Reported on 5/31/2018) 30 tablet 1     ibuprofen (ADVIL/MOTRIN) 600 MG tablet Take 1 tablet (600 mg) by mouth every 6 hours as needed for moderate pain 90 tablet 1     metFORMIN (GLUCOPHAGE) 500 MG tablet Take 1 tablet (500 mg) by mouth 2 times daily (with meals) 180 tablet 3     oxymetazoline (AFRIN NASAL SPRAY) 0.05 % spray Spray 2-3 sprays into both nostrils 2 times daily as needed for congestion (Patient not taking: Reported on 6/29/2018) 1 Bottle 0            Allergies   Allergen Reactions     No Known Allergies        No results found for this or any previous visit (from the past 24 hour(s)).         Review of Systems:   10 point ROS negative except noted in above in HPI          Physical Exam:     There were no vitals filed for this visit.  There is no height or weight on file to calculate BMI.    GENERAL APPEARANCE: healthy, alert and no distress,  NECK: no adenopathy, no asymmetry, masses, or scars and thyroid normal to palpation  RESP: lungs clear to auscultation - no rales, rhonchi or wheezes  CV: regular rate and rhythm,  and no murmur, click,  rub or gallop  Feet: normal monofilament test bilaterally       Assessment and Plan     (E11.9) Type 2 diabetes mellitus without complication, without long-term current use of insulin (H)  (primary encounter diagnosis)  Comm)ent: A1c today is 6.6 (last year was 6.5). Only diet controlled  Plan: Lipid Panel ([facility]) - Results < 1 hr, Thyroid         [place] [facility])Hemoglobin A1c ([facility]), Basic Metabolic Panel         ([facility]) - Results < 1 hr  - advised to decrease rice intake and switch from white rice to brown   - increase exercise  - patient refuses to take medication   - recheck A1c in 3 months     (E66.09,  Z68.31) Class 1 obesity due to excess calories without serious comorbidity with body mass index (BMI) of 31.0 to 31.9 in adult  Comment: discussed behavioral changes  Plan:   - decrease rice intake, switch to brown rice  - increase exercise to 30 min daily       Options for treatment and follow-up care were reviewed with the patient and/or guardian. Jimmie Barrientos and/or guardian engaged in the decision making process and verbalized understanding of the options discussed and agreed with the final plan.      Lyndsey Ceron MD   [facility] Resident   Pager: [phone]      Precepted today with: Hung Sanders MD

## 2019-09-12 NOTE — PROGRESS NOTES
Preceptor Attestation:   Patient seen, evaluated and discussed with the resident. I have verified the content of the note, which accurately reflects my assessment of the patient and the plan of care.  Supervising Physician:Hung Sanders MD  [facility]

## 2019-09-12 NOTE — NURSING NOTE
Due to patient being non-English speaking/uses sign language, an  was used for this visit. Only for face-to-face interpretation by an external agency, date and length of interpretation can be found on the scanned worksheet.     name: Kady   Agency: [employer]  Language: Alexander   Telephone number: [phone]  Type of interpretation: Face-to-face, spoken

## 2019-09-12 NOTE — RESULT ENCOUNTER NOTE
Results discussed directly with patient while patient was present. Any further details documented in the note.   Lyndsey Ceron MD

## 2019-09-13 LAB — TSH SERPL DL<=0.05 MIU/L-ACNC: 1.38 UIU/ML (ref 0.3–5)

## 2019-09-13 NOTE — RESULT ENCOUNTER NOTE
Please send a letter for these results. The patient may continue her current plan of care.  All labs within normal limits.   Please follow up in 3 months for diabetes check.     Lyndsey Ceron MD

## 2019-10-22 ENCOUNTER — OFFICE VISIT (OUTPATIENT)
Dept: FAMILY MEDICINE | Facility: CLINIC | Age: 44
End: 2019-10-22
Payer: COMMERCIAL

## 2019-10-22 VITALS
BODY MASS INDEX: 30.52 KG/M2 | HEART RATE: 71 BPM | DIASTOLIC BLOOD PRESSURE: 80 MMHG | TEMPERATURE: 97.5 F | SYSTOLIC BLOOD PRESSURE: 122 MMHG | RESPIRATION RATE: 18 BRPM | OXYGEN SATURATION: 99 % | HEIGHT: 59 IN | WEIGHT: 151.4 LBS

## 2019-10-22 DIAGNOSIS — Z87.448 HISTORY OF HYDRONEPHROSIS: ICD-10-CM

## 2019-10-22 DIAGNOSIS — Z86.39 HISTORY OF HYPOKALEMIA: ICD-10-CM

## 2019-10-22 DIAGNOSIS — M54.6 ACUTE MIDLINE THORACIC BACK PAIN: Primary | ICD-10-CM

## 2019-10-22 DIAGNOSIS — E87.6 HYPOKALEMIA: ICD-10-CM

## 2019-10-22 LAB
BUN SERPL-MCNC: 9 MG/DL (ref 5–24)
CALCIUM SERPL-MCNC: 9 MG/DL (ref 8.5–10.4)
CHLORIDE SERPLBLD-SCNC: 103 MMOL/L (ref 94–109)
CO2 SERPL-SCNC: 25 MMOL/L (ref 20–32)
CREAT SERPL-MCNC: 0.6 MG/DL (ref 0.6–1.3)
EGFR CALCULATED (BLACK REFERENCE): >90 ML/MIN
EGFR CALCULATED (NON BLACK REFERENCE): >90 ML/MIN
GLUCOSE SERPL-MCNC: 228 MG/DL (ref 60–109)
POTASSIUM SERPL-SCNC: 2.7 MMOL/L (ref 3.4–5.3)
SODIUM SERPL-SCNC: 138 MMOL/L (ref 133–144)

## 2019-10-22 RX ORDER — CYCLOBENZAPRINE HCL 10 MG
10 TABLET ORAL
Qty: 14 TABLET | Refills: 0 | Status: SHIPPED | OUTPATIENT
Start: 2019-10-22 | End: 2019-11-20

## 2019-10-22 RX ORDER — NAPROXEN 500 MG/1
500 TABLET ORAL 2 TIMES DAILY WITH MEALS
Qty: 60 TABLET | Refills: 0 | Status: SHIPPED | OUTPATIENT
Start: 2019-10-22 | End: 2019-11-20

## 2019-10-22 ASSESSMENT — MIFFLIN-ST. JEOR: SCORE: 1251.34

## 2019-10-22 NOTE — NURSING NOTE
Due to patient being non-English speaking/uses sign language, an  was used for this visit. Only for face-to-face interpretation by an external agency, date and length of interpretation can be found on the scanned worksheet.     name: [id number]  Agency: [employer]  Language: Alexander   Telephone number: -  Type of interpretation: Ipad     GERALDINE Langley

## 2019-10-22 NOTE — PROGRESS NOTES
Assessment and Plan   1. Acute midline thoracic back pain  Evaluated for signs/symptoms for kidney stone, meningitis, dissection, but nothing that would indicate any of those. Will treat conservatively for now with the below, could consider PT if the problem persists. Went over clear RTC instructions such as fever, chills, worsening pain, or other concerning symptoms.  - naproxen (NAPROSYN) 500 MG tablet; Take 1 tablet (500 mg) by mouth 2 times daily (with meals)  Dispense: 60 tablet; Refill: 0  - cyclobenzaprine (FLEXERIL) 10 MG tablet; Take 1 tablet (10 mg) by mouth nightly as needed for muscle spasms  Dispense: 14 tablet; Refill: 0    2. Hypokalemia  At last visit, K of 3.2 although looking back she has been as low as 2.7 in 2016 - reviewed her medication list and has not been on anything that would cause hypokalemia. Will recheck today.  - Basic Metabolic Panel ([facility]) - Results < 1 hr    3. History of hydronephrosis  I noted that in 12/2017 she had an obstructing kidney stone resulting in hydronephrosis - she passed the stone and her symptoms today do not seem indicative of this without flank pain or urinary symptoms but will get full BMP to assess kidney function.  - Basic Metabolic Panel ([facility]) - Results < 1 hr    Follow up in 2 weeks or sooner if not improving or with worsening symptoms.    Options for treatment and follow-up care were reviewed with the patient and/or guardian. Jimmie Barrientos and/or guardian engaged in the decision making process and verbalized understanding of the options discussed and agreed with the final plan.    Osmar Armando MD  [facility], PGY-3    Precepted patient with Dr. Liban Malone       HPI:   Jimmie Barrientos is a 44 year old female who presents to clinic today for   Chief Complaint   Patient presents with     Back Pain     Pt complains of back pain which sometimes causes headaches/nausea x 1 wk      Medication Reconciliation     Completed     Patient has been having mid back pain - it has been going on for about 1 week. No specific injury to her back. Has never had back pain like this before. The pain radiates to her head and causes headache along with nausea. Also notes that pain radiates to the sternum - a dull pain. Patient has had indigestion in the pain, and this feels similar. No SOB. No numbness or tingling in her body. No fevers or chills at home. No vomiting or diarrhea, no dysuria, no hematuria, no low back or flank pain.    Overall, pain is dull - not excruciating.    Patient has been taking Tylenol at home, has not been helping.    A [employer]  was used for this visit         Review of Systems:     Constitutional, HEENT, cardiovascular, pulmonary, GI, musculoskeletal, neuro, skin, and psych systems are negative, except as otherwise noted.          PMHX:   Active Problems List  Patient Active Problem List   Diagnosis     Hepatitis B carrier (H)     Pap smear for cervical cancer screening     Overweight     Type 2 diabetes mellitus without complication, without long-term current use of insulin (H)     Urinary tract stones     Chronic allergic conjunctivitis     Class 1 obesity with body mass index (BMI) of 31.0 to 31.9 in adult     History of hypokalemia     Active problem list reviewed and updated.    Current Medications  Current Outpatient Medications   Medication Sig Dispense Refill     cyclobenzaprine (FLEXERIL) 10 MG tablet Take 1 tablet (10 mg) by mouth nightly as needed for muscle spasms 14 tablet 0     naproxen (NAPROSYN) 500 MG tablet Take 1 tablet (500 mg) by mouth 2 times daily (with meals) 60 tablet 0     Medication list reviewed and updated.    Social History  Social History     Tobacco Use     Smoking status: Never Smoker     Smokeless tobacco: Never Used   Substance Use Topics     Alcohol use: No     Alcohol/week: 0.0 standard drinks     Drug use: No     History   Drug Use No  "    Family History  Family History   Problem Relation Age of Onset     Family History Negative Other      Diabetes Mother      Coronary Artery Disease No family hx of      Breast Cancer No family hx of      Colon Cancer No family hx of      Prostate Cancer No family hx of      Other Cancer No family hx of      Allergies  Allergies   Allergen Reactions     No Known Allergies           Physical Exam:     Vitals:    10/22/19 1126   BP: 122/80   Pulse: 71   Resp: 18   Temp: 97.5  F (36.4  C)   TempSrc: Oral   SpO2: 99%   Weight: 68.7 kg (151 lb 6.4 oz)   Height: 1.505 m (4' 11.25\")     Body mass index is 30.32 kg/m .    GENERAL APPEARANCE: alert, appears stated age, no acute distress  HEENT: Eyes grossly normal to inspection, nares normal  RESP: lungs clear to auscultation - no rales, rhonchi, or wheezes  CV: regular rate and rhythm, no murmur, click, rub, or gallop, sternum mildly TTP  ABDOMEN: soft, nontender   MSK: extremities normal, no gross deformities noted, no lower extremity edema  BACK: Mildly TTP mid thoracic spine, no paraspinal tenderness, no C or L spine tenderness, neg SLR bilaterally, no nuchal rigidity  NEURO: Normal strength and tone, sensory exam grossly normal, mentation appears intact and speech normal  PSYCH: mood and affect normal  "

## 2019-10-22 NOTE — PROGRESS NOTES
Preceptor Attestation:   Patient seen, evaluated and discussed with the resident. I have verified the content of the note, which accurately reflects my assessment of the patient and the plan of care.  Supervising Physician:Liban Malone MD  [facility]

## 2019-10-23 RX ORDER — POTASSIUM CHLORIDE 1.5 G/1.58G
20 POWDER, FOR SOLUTION ORAL 2 TIMES DAILY
Qty: 100 PACKET | Refills: 0 | Status: SHIPPED | OUTPATIENT
Start: 2019-10-23 | End: 2019-11-20

## 2019-10-23 NOTE — RESULT ENCOUNTER NOTE
Attempted to call the patient via [employer], several attempts were made but whoever answered the phone kept hanging up before the  could identify themselves.    Rx sent to pharmacy for K supplement - will attempt to contact the patient again tomorrow.    Osmar Armando MD  [facility], PGY-3

## 2019-11-20 ENCOUNTER — OFFICE VISIT (OUTPATIENT)
Dept: FAMILY MEDICINE | Facility: CLINIC | Age: 44
End: 2019-11-20
Payer: COMMERCIAL

## 2019-11-20 VITALS
RESPIRATION RATE: 18 BRPM | WEIGHT: 152 LBS | HEIGHT: 59 IN | OXYGEN SATURATION: 98 % | BODY MASS INDEX: 30.64 KG/M2 | DIASTOLIC BLOOD PRESSURE: 81 MMHG | HEART RATE: 72 BPM | SYSTOLIC BLOOD PRESSURE: 119 MMHG

## 2019-11-20 DIAGNOSIS — E87.6 HYPOKALEMIA: Primary | ICD-10-CM

## 2019-11-20 LAB
% GRANULOCYTES: 70.6 %G (ref 40–75)
BILIRUBIN UR: NEGATIVE MG/DL
BLOOD UR: NEGATIVE MG/DL
BUN SERPL-MCNC: 8 MG/DL (ref 5–24)
CALCIUM SERPL-MCNC: 8.9 MG/DL (ref 8.5–10.4)
CHLORIDE SERPLBLD-SCNC: 103 MMOL/L (ref 94–109)
CK SERPL-CCNC: 67 U/L (ref 30–190)
CO2 SERPL-SCNC: 25 MMOL/L (ref 20–32)
CREAT SERPL-MCNC: 0.8 MG/DL (ref 0.6–1.3)
CREAT UR-MCNC: 213.8 MG/DL
EGFR CALCULATED (BLACK REFERENCE): >90 ML/MIN
EGFR CALCULATED (NON BLACK REFERENCE): 83.2 ML/MIN
GLUCOSE SERPL-MCNC: 201 MG/DL (ref 60–109)
GLUCOSE URINE: ABNORMAL
GRANULOCYTES #: 4.8 K/UL (ref 1.6–8.3)
HCT VFR BLD AUTO: 38.9 % (ref 35–47)
HEMOGLOBIN: 12.2 G/DL (ref 11.7–15.7)
KETONES UR QL: NEGATIVE MG/DL
LEUKOCYTE ESTERASE UR: NEGATIVE
LYMPHOCYTES # BLD AUTO: 1.6 K/UL (ref 0.8–5.3)
LYMPHOCYTES NFR BLD AUTO: 23.8 %L (ref 20–48)
MAGNESIUM SERPL-MCNC: 1.8 MG/DL (ref 1.8–2.6)
MCH RBC QN AUTO: 27.9 PG (ref 26.5–35)
MCHC RBC AUTO-ENTMCNC: 31.4 G/DL (ref 32–36)
MCV RBC AUTO: 89.1 FL (ref 78–100)
MID #: 0.4 K/UL (ref 0–2.2)
MID %: 5.6 %M (ref 0–20)
NITRITE UR QL STRIP: NEGATIVE MG/DL
PH UR STRIP: 6 [PH] (ref 4.5–8)
PLATELET # BLD AUTO: 172 K/UL (ref 150–450)
POTASSIUM SERPL-SCNC: 2.8 MMOL/L (ref 3.4–5.3)
PROTEIN UR: NEGATIVE MG/DL
RBC # BLD AUTO: 4.37 M/UL (ref 3.8–5.2)
SODIUM SERPL-SCNC: 139 MMOL/L (ref 133–144)
SP GR UR STRIP: >=1.03 (ref 1–1.03)
TSH SERPL DL<=0.05 MIU/L-ACNC: 1.28 UIU/ML (ref 0.3–5)
UROBILINOGEN UR STRIP-ACNC: ABNORMAL E.U./DL
WBC # BLD AUTO: 6.8 K/UL (ref 4–11)

## 2019-11-20 RX ORDER — POTASSIUM CHLORIDE 1.5 G/1.58G
20 POWDER, FOR SOLUTION ORAL 2 TIMES DAILY
Qty: 100 PACKET | Refills: 0 | Status: SHIPPED | OUTPATIENT
Start: 2019-11-20 | End: 2019-11-22

## 2019-11-20 ASSESSMENT — MIFFLIN-ST. JEOR: SCORE: 1250.97

## 2019-11-20 NOTE — RESULT ENCOUNTER NOTE
Please call with results.   Your potassium is still low at 2.8 (last month it was 2.7). I have sent the Rx of potassium supplement to your pharmacy. Please take as prescribed and follow up in 2 weeks so we can review the other labs (not back yet).     Lyndsey Ceron MD

## 2019-11-20 NOTE — PROGRESS NOTES
HPI:       Jimmie Barrientos is a 44 year old  female with PMH significant for obesity, DM2, hypokalemia who presents for:      1. Follow up on hypokalemia   - saw Dr. Armando on 10/22/2019 and potassium was low at 2.7  - was started on potassium replacement PO and is following up potassium today   - was not able to  the potassium supplement because of insurance   - no chest pain or sob   - no diarrhea or emesis  - no pica   - no thyroid issues in the past: no current symptoms or hyper or hypothyroidism   - no family history of kidney disease  - sometimes has twitches: hands, legs, skin but has not had them in weeks      A [employer]  was used for this visit         PMHX:     Patient Active Problem List   Diagnosis     Hepatitis B carrier (H)     Pap smear for cervical cancer screening     Overweight     Type 2 diabetes mellitus without complication, without long-term current use of insulin (H)     Urinary tract stones     Chronic allergic conjunctivitis     Class 1 obesity with body mass index (BMI) of 31.0 to 31.9 in adult     History of hypokalemia       No current outpatient medications on file.          Allergies   Allergen Reactions     No Known Allergies        Results for orders placed or performed in visit on 11/20/19 (from the past 24 hour(s))   Basic Metabolic Panel ([facility]) - Results < 1 hr   Result Value Ref Range    Glucose 201.0 (H) 60.0 - 109.0 mg/dL    Urea Nitrogen 8.0 5.0 - 24.0 mg/dL    Creatinine 0.8 0.6 - 1.3 mg/dL    Sodium 139.0 133.0 - 144.0 mmol/L    Potassium 2.8 (L) 3.4 - 5.3 mmol/L    Chloride 103.0 94.0 - 109.0 mmol/L    Carbon Dioxide 25.0 20.0 - 32.0 mmol/L    Calcium 8.9 8.5 - 10.4 mg/dL    eGFR Calculated (Non Black Reference) 83.2 >60.0 mL/min    eGFR Calculated (Black Reference) >90 >60.0 mL/min   CBC with Diff Plt ([facility])   Result Value Ref Range    WBC 6.8 4.0 - 11.0 K/uL    Lymphocytes # 1.6 0.8 - 5.3 K/uL    % Lymphocytes 23.8 20.0 - 48.0 %L    Mid # 0.4 0.0 - 2.2  "K/uL    Mid % 5.6 0.0 - 20.0 %M    GRANULOCYTES # 4.8 1.6 - 8.3 K/uL    % Granulocytes 70.6 40.0 - 75.0 %G    RBC 4.37 3.80 - 5.20 M/uL    Hemoglobin 12.2 11.7 - 15.7 g/dL    Hematocrit 38.9 35.0 - 47.0 %    MCV 89.1 78.0 - 100.0 fL    MCH 27.9 26.5 - 35.0 pg    MCHC 31.4 (L) 32.0 - 36.0 g/dL    Platelets 172.0 150.0 - 450.0 K/uL   Urinalysis ([facility])   Result Value Ref Range    Specific Gravity Urine >=1.030 1.005 - 1.030    pH Urine 6.0 4.5 - 8.0    Leukocyte Esterase UR Negative NEGATIVE    Nitrite Urine Negative NEGATIVE mg/dL    Protein UR Negative NEGATIVE mg/dL    Glucose Urine Trace (A) NEGATIVE    Ketones Urine Negative NEGATIVE mg/dL    Urobilinogen mg/dL 1.0 E.U./dL (A) 0.2 E.U./dL E.U./dL    Bilirubin UR Negative NEGATIVE mg/dL    Blood UR Negative NEGATIVE mg/dL            Review of Systems:   10 point ROS negative except noted in above in HPI         Physical Exam:     Vitals:    11/20/19 1135   BP: 119/81   Pulse: 72   Resp: 18   SpO2: 98%   Weight: 68.9 kg (152 lb)   Height: 1.5 m (4' 11.06\")     Body mass index is 30.64 kg/m .    GENERAL APPEARANCE: healthy, alert and no distress,  NECK: no adenopathy, no asymmetry, masses, or scars and thyroid normal to palpation  RESP: lungs clear to auscultation - no rales, rhonchi or wheezes  CV: regular rate and rhythm,  and no murmur, click,  rub or gallop      Assessment and Plan       (E87.6) Hypokalemia  (primary encounter diagnosis)  Comment: unclear cause of hypokalemia at this time. No evidence of GI losses, not likely medication related (no lasix, insulin, albuterol), no supplements. No pica likes symptoms, no binding resins. No personal or family history of thyroid or kidney disease. DDx includes: pseudohypokalemia, intracellular shift, renal loss.  Plan: Thyroid Belden ([facility]), Urinalysis (P         ), CBC with Diff Plt ([facility]), Magnesium         ([facility]), CK Total ([facility]), Creatinine        Random Urine ([facility]), Potassium, 24 " Hour         Urine ([facility]), Misc. Wild Test         ([facility]), CANCELED: Aldosterone Renin         Ratio, CANCELED: Potassium random urine,         CANCELED: Creatine timed random urine,         CANCELED: Aldosterone/Renin Ratio ([facility]),        CANCELED: Potassium, 24 Hour Urine         ([facility]), CANCELED: Aldosterone/Renin Ratio        ([facility])  - CBC, TSH, magnesium, CK, plasma renin:aldosterone ratio, Urine creatinine, urine potassium, UA, BMP all collected today   - potassium 10/22 was 2.7, today potassium 2.8 but she has not picked up her potassium replacement yet due to insurance  - will re-send potassium Rx and follow up other labs with patient         Options for treatment and follow-up care were reviewed with the patient and/or guardian. Jimmie Barrientos and/or guardian engaged in the decision making process and verbalized understanding of the options discussed and agreed with the final plan.      Lyndsey Ceron MD   [facility] Resident   Pager: [phone]      Precepted today with: Henok Smith MD

## 2019-11-21 ENCOUNTER — TELEPHONE (OUTPATIENT)
Dept: FAMILY MEDICINE | Facility: CLINIC | Age: 44
End: 2019-11-21

## 2019-11-21 NOTE — TELEPHONE ENCOUNTER
Stephy from [facility] lab called, need more blood for Renin test.  Informed her since dr ordered it wrong, we already drawn a purple tube, sent in refrigerator temp and one red tube serum frozen and 1 serum refrigerator temp.  She said they can run the Aldosterone, but will need more blood to do Renin since that is required frozen plasma.   Spoke to patient since she will be dropping off 24 hours urine jug tomorrow, we can draw her again for the Renin test.      Waiting to see if [facility] can run the Aldosterone, if not we will reordered it tomorrow when pt arrive as MISC WILD-Aldosterone Renin, which required 1 red tube serum (Frozen) and 2 purple EDTA-plasma (frozen).  If [facility] resulted for Aldosterone, then just need 2 purple tubes for Renin, plasma, frozen. RONNIE Oh.

## 2019-11-22 ENCOUNTER — TELEPHONE (OUTPATIENT)
Dept: PHARMACY | Facility: PHYSICIAN GROUP | Age: 44
End: 2019-11-22

## 2019-11-22 DIAGNOSIS — E87.6 HYPOKALEMIA: ICD-10-CM

## 2019-11-22 DIAGNOSIS — E87.6 HYPOKALEMIA: Primary | ICD-10-CM

## 2019-11-22 LAB — ALDOST SERPL-MCNC: 4.1 NG/DL

## 2019-11-22 RX ORDER — POTASSIUM CHLORIDE 1500 MG/1
20 TABLET, EXTENDED RELEASE ORAL 2 TIMES DAILY
Qty: 60 TABLET | Refills: 1 | Status: SHIPPED | OUTPATIENT
Start: 2019-11-22 | End: 2020-06-08

## 2019-11-22 NOTE — TELEPHONE ENCOUNTER
Called Jimmie Barrientos with  ID#[id number]. Unable to leave a message on mobile number due to voicemail box being full. We then called the spouse number on file and informed pt of medication changes.

## 2019-11-22 NOTE — TELEPHONE ENCOUNTER
Report by patient during lab appointment that potassium packets not covered by insurance. Review of [facility] formulary reveals that potassium chloride oral tablet/capsule extended release covered agents. Discussed with Dick, change to ER tablets.     Jaki Cortez, PharmD, CDE  [facility]  Phone: [phone]  November 22, 2019 at 10:44 AM

## 2019-11-25 LAB
DEPARTMENT: NORMAL
PERFORMING LAB: NORMAL
POTASSIUM 24H UR-SRATE: 11 MMOL/24HR (ref 30–90)
POTASSIUM UR-SCNC: 19.4 MMOL/L
SCAN RESULT: NORMAL
SEE NOTE: NORMAL
SPECIMEN VOL UR: 550 ML

## 2019-11-26 NOTE — RESULT ENCOUNTER NOTE
Please call with results.   Your urine potassium is low.    Please make appointment so we can discuss these results and any next steps, as well as checking your potassium now that you are taking the potassium replacement.    Lyndsey Ceron MD

## 2019-11-26 NOTE — RESULT ENCOUNTER NOTE
Please call with results.   All of the labs we checked do not necessarily show why your potassium has been low.   Your thyroid is normal.   Magnesium normal.   Please make appointment so we can discuss these results and any next steps, as well as checking your potassium now that you are taking the potassium replacement.         Lyndsey Ceron MD

## 2019-11-27 NOTE — PROGRESS NOTES
Preceptor Attestation:  Patient's case reviewed and discussed with Lyndsey Ceron MD resident and I evaluated the patient. I agree with written assessment and plan of care.  Supervising Physician:  Henok Smith MD, MD [facility]  [facility]

## 2020-06-08 ENCOUNTER — AMBULATORY - HEALTHEAST (OUTPATIENT)
Dept: FAMILY MEDICINE | Facility: CLINIC | Age: 45
End: 2020-06-08

## 2020-06-08 ENCOUNTER — OFFICE VISIT (OUTPATIENT)
Dept: FAMILY MEDICINE | Facility: CLINIC | Age: 45
End: 2020-06-08
Payer: COMMERCIAL

## 2020-06-08 VITALS
RESPIRATION RATE: 18 BRPM | TEMPERATURE: 98.1 F | OXYGEN SATURATION: 99 % | DIASTOLIC BLOOD PRESSURE: 77 MMHG | SYSTOLIC BLOOD PRESSURE: 112 MMHG | HEART RATE: 87 BPM

## 2020-06-08 DIAGNOSIS — R10.13 ABDOMINAL PAIN, EPIGASTRIC: ICD-10-CM

## 2020-06-08 DIAGNOSIS — E87.6 HYPOKALEMIA: ICD-10-CM

## 2020-06-08 DIAGNOSIS — R53.83 OTHER FATIGUE: Primary | ICD-10-CM

## 2020-06-08 DIAGNOSIS — R06.02 SOB (SHORTNESS OF BREATH): ICD-10-CM

## 2020-06-08 DIAGNOSIS — E11.9 TYPE 2 DIABETES MELLITUS WITHOUT COMPLICATION, WITHOUT LONG-TERM CURRENT USE OF INSULIN (H): ICD-10-CM

## 2020-06-08 LAB
ANION GAP SERPL CALCULATED.3IONS-SCNC: 11 MMOL/L (ref 5–18)
BUN SERPL-MCNC: 7 MG/DL (ref 8–22)
CALCIUM SERPL-MCNC: 9 MG/DL (ref 8.5–10.5)
CHLORIDE SERPL-SCNC: 102 MMOL/L (ref 98–107)
CO2 SERPL-SCNC: 22 MMOL/L (ref 22–31)
CREAT SERPL-MCNC: 0.77 MG/DL (ref 0.6–1.1)
GLUCOSE SERPL-MCNC: 202 MG/DL (ref 70–125)
HBA1C MFR BLD: 6.9 % (ref 4.1–5.7)
HEMOGLOBIN: 13 G/DL (ref 11.7–15.7)
POTASSIUM SERPL-SCNC: 3.2 MMOL/L (ref 3.5–5)
SODIUM SERPL-SCNC: 135 MMOL/L (ref 136–145)

## 2020-06-08 RX ORDER — POTASSIUM CHLORIDE 1500 MG/1
20 TABLET, EXTENDED RELEASE ORAL 2 TIMES DAILY
Qty: 60 TABLET | Refills: 1 | Status: SHIPPED | OUTPATIENT
Start: 2020-06-08 | End: 2020-06-09

## 2020-06-08 RX ORDER — FAMOTIDINE 20 MG/1
20 TABLET, FILM COATED ORAL 2 TIMES DAILY
Qty: 60 TABLET | Refills: 1 | Status: SHIPPED | OUTPATIENT
Start: 2020-06-08 | End: 2021-04-21

## 2020-06-08 NOTE — PROGRESS NOTES
HPI:       Jimmie Barrientos is a 44 year old  female who presents to address the following concerns:    This is a pleasant 44-year-old long female who speaks mom is her first language presenting for new symptoms in the last week of weakness fatigue lack of appetite.  She is also reporting ongoing issues with GERD type symptoms with abdominal pain in her upper stomach and increased mucus production.  Patient reports that she felt this way away last fall and was seen in clinic found to have a low potassium and was treated with a potassium supplement.  Etiology for low potassium was unknown.  Since she started taking the potassium supplement it was clearly better but stopped taking the supplement when her symptoms worse consistently improved has not taken potassium supplement for quite some time now.  Denies blood in her stool denies hematemesis or hematochezia.  She does report consistent abdominal pain in her upper stomach which is exacerbated by food.  She has not had H. pylori evaluation in the past.  Pain results and little appetite.  She did have a bowel movement yesterday morning.    Patient lives at home with her  and children she reports that 1 of her children goes to work but she is unsure of the type of work that he does.  She said she has a extended relative with COVID-19 who called to tell her about this but she has not had personal contact with this relative she is unsure if other relatives have had contact with the relative and then with her.  She denies fever she denies cough denies allergies to medications denies chest pain she does have some shortness of breath when she is coughing and producing phlegm.           PMHX:     Patient Active Problem List   Diagnosis     Hepatitis B carrier (H)     Pap smear for cervical cancer screening     Overweight     Type 2 diabetes mellitus without complication, without long-term current use of insulin (H)     Urinary tract stones     Chronic allergic  conjunctivitis     Class 1 obesity with body mass index (BMI) of 31.0 to 31.9 in adult     History of hypokalemia       Current Outpatient Medications   Medication Sig Dispense Refill     famotidine (PEPCID) 20 MG tablet Take 1 tablet (20 mg) by mouth 2 times daily 60 tablet 1     potassium chloride ER (KLOR-CON M) 20 MEQ CR tablet Take 1 tablet (20 mEq) by mouth 2 times daily 60 tablet 1          Allergies   Allergen Reactions     No Known Allergies        Results for orders placed or performed in visit on 06/08/20 (from the past 24 hour(s))   Hemoglobin A1c ([facility])   Result Value Ref Range    Hemoglobin A1C 6.9 (H) 4.1 - 5.7 %   Hemoglobin (HGB) ([facility])   Result Value Ref Range    Hemoglobin 13.0 11.7 - 15.7 g/dL       Current Outpatient Medications   Medication     famotidine (PEPCID) 20 MG tablet     potassium chloride ER (KLOR-CON M) 20 MEQ CR tablet     No current facility-administered medications for this visit.               Review of Systems:   ROS as described above.  Denies F/S/C/N/V/SOB/CP          Physical Exam:     Vitals:    06/08/20 0923   BP: 112/77   Pulse: 87   Resp: 18   Temp: 98.1  F (36.7  C)   TempSrc: Oral   SpO2: 99%     There is no height or weight on file to calculate BMI.    GEN: patient sitting comfortably in NAD  HEEN: Head is atraumatic, normocephalic, eyes anicteric, mucous membranes moist  CV: RRR w/o M/R/G  PULM: CTAB without w/r/r  ABD: soft, nontender, bowel sounds present  NEURO: Alert and oriented x3.  No focal motor abnormalities.  Face symmetric.  PSYCH: appropriate  SKIN: No rashes, bruising, or other lesions    Results for orders placed or performed in visit on 06/08/20   Hemoglobin A1c ([facility])     Status: Abnormal   Result Value Ref Range    Hemoglobin A1C 6.9 (H) 4.1 - 5.7 %   Hemoglobin (HGB) ([facility])     Status: None   Result Value Ref Range    Hemoglobin 13.0 11.7 - 15.7 g/dL       Assessment and Plan     1. Other fatigue-ddx currently broad.  Will need hgb, bmp  rechecked as well as A1c.  Given concurrent SOB with increased phlegm production and extended relative with COVID will order     2. Type 2 diabetes mellitus without complication, without long-term current use of insulin (H)  - Hemoglobin A1c (P )    3. Abdominal pain, epigastric-  - H. Pylori Agn Fecal ([facility]); Future  - Hemoglobin (HGB) ([facility])  - famotidine (PEPCID) 20 MG tablet; Take 1 tablet (20 mg) by mouth 2 times daily  Dispense: 60 tablet; Refill: 1    4. Hypokalemia-  - potassium chloride ER (KLOR-CON M) 20 MEQ CR tablet; Take 1 tablet (20 mEq) by mouth 2 times daily  Dispense: 60 tablet; Refill: 1  - Basic Metabolic Profile ([facility])    5. SOB (shortness of breath)-+ extended family member.   - Symptomatic COVID-19 Virus (Coronavirus) by PCR; Future    Options for treatment and follow-up care were reviewed with the patient and/or guardian. Jimmie Barrientos and/or guardian engaged in the decision making process and verbalized understanding of the options discussed and agreed with the final plan.    Aarti Jennings MD

## 2020-06-09 RX ORDER — POTASSIUM CHLORIDE 1500 MG/1
20 TABLET, EXTENDED RELEASE ORAL DAILY
Qty: 90 TABLET | Refills: 1 | Status: SHIPPED | OUTPATIENT
Start: 2020-06-09 | End: 2020-11-17

## 2020-06-09 NOTE — RESULT ENCOUNTER NOTE
Call.  Alexander speaking.    Jimmie-Your lab testing is looking reassuring.  Your potassium is a little low, but not as low as it was last fall.  I do recommend that we restart your postassium supplements.  Your hemoglobin was normal.  The test of your diabetes did show that your diabetes has gotten worse.  We should treat you with medications to control your diabetes and protect your heart and kidneys.  I recommend a return office visit to talk about your diabetes.      As soon as you can, please bring back your stool test so we can help see if a stomach infection is causing your stomach pain.    Dr Jennings

## 2020-06-10 ENCOUNTER — TELEPHONE (OUTPATIENT)
Dept: FAMILY MEDICINE | Facility: CLINIC | Age: 45
End: 2020-06-10

## 2020-06-10 ENCOUNTER — AMBULATORY - HEALTHEAST (OUTPATIENT)
Dept: FAMILY MEDICINE | Facility: CLINIC | Age: 45
End: 2020-06-10

## 2020-06-10 DIAGNOSIS — R06.02 SOB (SHORTNESS OF BREATH): ICD-10-CM

## 2020-06-10 DIAGNOSIS — K21.9 GASTROESOPHAGEAL REFLUX DISEASE, ESOPHAGITIS PRESENCE NOT SPECIFIED: Primary | ICD-10-CM

## 2020-06-10 DIAGNOSIS — R10.13 ABDOMINAL PAIN, EPIGASTRIC: ICD-10-CM

## 2020-06-10 NOTE — TELEPHONE ENCOUNTER
C/o burning sensation in mid, epigastric. Has taken Pepcid for the last two days and this has not helped relieve discomfort. Would like to request Omeprazole which she was given back in [id number] to see if this would help. if approved for alternative, please send to [facility]. Thank you. Cande VILLEGAS

## 2020-06-10 NOTE — TELEPHONE ENCOUNTER
[facility] phone call message- general phone call:    Reason for call: Patient called stating she is unsure if why but her stomach is still feeling spicy and not sure if it's just something that she is eating or if it's medication that is making her stomach feel that way/ patient unsure of which medication it is that is making her feel like that but states it's one for her stomach issue. Patient wants to know if pcp can send a different medication for her that will not make her stomach feel spicy. Patient also states she dropped of lab specimen today and was wanting to know if her results are back or when she will results be back and if someone could contact her for results once it's back Please call and advise.    Return call needed: Yes    OK to leave a message on voice mail? Yes    Primary language: Alexander      needed? Yes    Call taken on Catrina 10, 2020 at 3:59 PM by Nella Duval

## 2020-06-12 ENCOUNTER — COMMUNICATION - HEALTHEAST (OUTPATIENT)
Dept: FAMILY MEDICINE | Facility: CLINIC | Age: 45
End: 2020-06-12

## 2020-06-12 ENCOUNTER — TELEPHONE (OUTPATIENT)
Dept: FAMILY MEDICINE | Facility: CLINIC | Age: 45
End: 2020-06-12

## 2020-06-12 DIAGNOSIS — K21.00 GASTROESOPHAGEAL REFLUX DISEASE WITH ESOPHAGITIS: Primary | ICD-10-CM

## 2020-06-12 LAB — H PYLORI ANTIGEN: POSITIVE

## 2020-06-12 RX ORDER — AMOXICILLIN 500 MG/1
1000 CAPSULE ORAL 2 TIMES DAILY
Qty: 56 CAPSULE | Refills: 0 | Status: SHIPPED | OUTPATIENT
Start: 2020-06-12 | End: 2020-06-24

## 2020-06-12 RX ORDER — CLARITHROMYCIN 500 MG
500 TABLET ORAL 2 TIMES DAILY
Qty: 28 TABLET | Refills: 0 | Status: SHIPPED | OUTPATIENT
Start: 2020-06-12 | End: 2020-06-24

## 2020-06-12 NOTE — PROGRESS NOTES
House officer response to urgent clinic in basket task:     Positive for H pylori.     Attempted to call patient and discuss results but she said she did not call.   Will have clinic staff reach out again    I have sent an Rx for triple therapy:   Clarithromycin, amoxicillin, and omeprazole for 14 days and needs to follow up with PCP     Lyndsey Ceron MD   [facility] Resident   Pager: [phone]

## 2020-06-12 NOTE — TELEPHONE ENCOUNTER
----- Message from RONNIE Wesley sent at 6/12/2020  1:11 PM CDT -----  RN, here is positive result. markx

## 2020-06-12 NOTE — TELEPHONE ENCOUNTER
Ordering physician not in clinic. Will route to PCP who is urgent task physician to review and give further recommendations/treatment. Thank you for your help. Cande VILLEGAS

## 2020-06-12 NOTE — TELEPHONE ENCOUNTER
Informed Jimmie, results not available. Will route to color team for when results are available. Thank you. Cande VILLEGAS

## 2020-06-12 NOTE — TELEPHONE ENCOUNTER
[facility] phone call message- patient requesting results:    Test: Lab    Date of test: 06/10/2020    Additional Comments: Patient is calling about her stool results that she dropped off on 6/10 and also wants to know her blood test results. Told her it could take up to two business days for a response. Please call and advise.     OK to leave a message on voice mail?     Primary language: ong      needed? Yes    Call taken on June 12, 2020 at 8:33 AM by Ranjit Duval

## 2020-06-15 NOTE — TELEPHONE ENCOUNTER
Patient informed of results. Will plan to  all medicaitons- potassium supplement and triple therapy for H Pylori. Cande VILLEGAS

## 2020-06-23 ENCOUNTER — OFFICE VISIT (OUTPATIENT)
Dept: FAMILY MEDICINE | Facility: CLINIC | Age: 45
End: 2020-06-23
Payer: COMMERCIAL

## 2020-06-23 VITALS
RESPIRATION RATE: 20 BRPM | SYSTOLIC BLOOD PRESSURE: 114 MMHG | TEMPERATURE: 98.6 F | DIASTOLIC BLOOD PRESSURE: 75 MMHG | HEART RATE: 88 BPM | OXYGEN SATURATION: 98 %

## 2020-06-23 DIAGNOSIS — R53.83 FATIGUE, UNSPECIFIED TYPE: Primary | ICD-10-CM

## 2020-06-23 DIAGNOSIS — A04.8 H. PYLORI INFECTION: ICD-10-CM

## 2020-06-23 DIAGNOSIS — R11.2 NON-INTRACTABLE VOMITING WITH NAUSEA, UNSPECIFIED VOMITING TYPE: ICD-10-CM

## 2020-06-23 DIAGNOSIS — E87.6 HYPOKALEMIA: ICD-10-CM

## 2020-06-23 LAB
BUN SERPL-MCNC: 6 MG/DL (ref 5–24)
CALCIUM SERPL-MCNC: 9.5 MG/DL (ref 8.5–10.4)
CHLORIDE SERPLBLD-SCNC: 103 MMOL/L (ref 94–109)
CO2 SERPL-SCNC: 19 MMOL/L (ref 20–32)
CREAT SERPL-MCNC: 0.6 MG/DL (ref 0.6–1.3)
EGFR CALCULATED (BLACK REFERENCE): >90 ML/MIN
EGFR CALCULATED (NON BLACK REFERENCE): >90 ML/MIN
GLUCOSE SERPL-MCNC: 261 MG/DL (ref 60–109)
HCT VFR BLD AUTO: 45.3 % (ref 35–47)
HEMOGLOBIN: 13.7 G/DL (ref 11.7–15.7)
MCH RBC QN AUTO: 27 PG (ref 26.5–35)
MCHC RBC AUTO-ENTMCNC: 30.2 G/DL (ref 32–36)
MCV RBC AUTO: 89.3 FL (ref 78–100)
PLATELET # BLD AUTO: 166 K/UL (ref 150–450)
POTASSIUM SERPL-SCNC: 3.5 MMOL/L (ref 3.4–5.3)
RBC # BLD AUTO: 5.07 M/UL (ref 3.8–5.2)
SODIUM SERPL-SCNC: 134 MMOL/L (ref 133–144)
WBC # BLD AUTO: 7.5 K/UL (ref 4–11)

## 2020-06-23 RX ORDER — ONDANSETRON 4 MG/1
4 TABLET, ORALLY DISINTEGRATING ORAL EVERY 8 HOURS PRN
Qty: 30 TABLET | Refills: 0 | Status: SHIPPED | OUTPATIENT
Start: 2020-06-23 | End: 2021-04-21

## 2020-06-23 NOTE — PROGRESS NOTES
Assessment and Plan   1. Fatigue, unspecified type  Unclear cause, rechecked BMP and CBC to ensure no electrolyte abnormalities or bleeding ulcer with anemia. Nonfocal symptoms, last TSH 11/2019 normal and I do not feel strongly about repeating today. Vitals normal, patient appears well. Could be related to nausea most likely from antibiotics as below.  - Basic Metabolic Panel ([facility]) - Results < 1 hr  - CBC with Plt ([facility])    2. Hypokalemia  K normal today at 3.5 although given her history of hypokalemia I instructed her to continue taking her supplement.  - Basic Metabolic Panel ([facility]) - Results < 1 hr    3. H. pylori infection  I am not confident that the patient has been taking her medications as prescribed. Discussed that we would not need to repeat stool test for ~1 month after finishing antibiotics. This is most certainly causing her epigastric pain, could also be contributing to her fatigue. I will have her return with PharmD co-visit to better assess medication compliance.    4. Non-intractable vomiting with nausea, unspecified vomiting type  Most likely from antibiotic use, will treat symptomatically as below. Discussed that she did not need IVFs and that we are unable to do here in clinic.  - ondansetron (ZOFRAN-ODT) 4 MG ODT tab; Take 1 tablet (4 mg) by mouth every 8 hours as needed for nausea  Dispense: 30 tablet; Refill: 0    Follow up in 1 Week(s).    Options for treatment and follow-up care were reviewed with the patient and/or guardian. Jimmie Barrientos and/or guardian engaged in the decision making process and verbalized understanding of the options discussed and agreed with the final plan.    Osmar Armando MD  [facility], PGY-3    Precepted patient with Dr. Anne Nathan       HPI:   Jimmie Barrientos is a 45 year old female who presents to clinic today for   Chief Complaint   Patient presents with     Fatigue     Abnormal Labs      Potassium     Patient was last seen 6/8 for fatigue - COVID-19 negative, hemoglobin normal, K mildly low at 3.2, H pylori positive.    Since that time, the patient states that she is feeling a bit better. However, is still feeling fatigued, has abdominal pain, and feels she needs IV fluids.    She was prescribed K tablets and states that she has been taking them.    She endorses poor appetite due to not liking the taste or smell of food.    She endorses pain and burning in her epigastric region. We discussed her 4 medications that she should be taking and she states that she has been taking them although is unsure time-wise when to do this.    She endorses that she has been vomiting 1-2 times every other day and has constant nausea.     No blood in her vomit. No blood in her stool.    A [employer]  was used for this visit.         Review of Systems:     Constitutional, HEENT, cardiovascular, pulmonary, GI, musculoskeletal, neuro, skin, and psych systems are negative, except as otherwise noted.          PMHX:   Active Problems List  Patient Active Problem List   Diagnosis     Hepatitis B carrier (H)     Pap smear for cervical cancer screening     Type 2 diabetes mellitus without complication, without long-term current use of insulin (H)     Urinary tract stones     Chronic allergic conjunctivitis     Class 1 obesity with body mass index (BMI) of 31.0 to 31.9 in adult     Active problem list reviewed and updated.    Current Medications  Current Outpatient Medications   Medication Sig Dispense Refill     amoxicillin (AMOXIL) 500 MG capsule Take 2 capsules (1,000 mg) by mouth 2 times daily for 14 days 56 capsule 0     clarithromycin (BIAXIN) 500 MG tablet Take 1 tablet (500 mg) by mouth 2 times daily for 14 days 28 tablet 0     famotidine (PEPCID) 20 MG tablet Take 1 tablet (20 mg) by mouth 2 times daily 60 tablet 1     omeprazole (PRILOSEC) 20 MG DR capsule Take 1 capsule (20 mg) by mouth 2 times daily for 14  days 28 capsule 0     potassium chloride ER (KLOR-CON M) 20 MEQ CR tablet Take 1 tablet (20 mEq) by mouth daily 90 tablet 1     Medication list reviewed and updated.    Social History  Social History     Tobacco Use     Smoking status: Never Smoker     Smokeless tobacco: Never Used   Substance Use Topics     Alcohol use: No     Alcohol/week: 0.0 standard drinks     Drug use: No     History   Drug Use No     Family History  Family History   Problem Relation Age of Onset     Family History Negative Other      Diabetes Mother      Coronary Artery Disease No family hx of      Breast Cancer No family hx of      Colon Cancer No family hx of      Prostate Cancer No family hx of      Other Cancer No family hx of      Allergies  Allergies   Allergen Reactions     No Known Allergies           Physical Exam:     Vitals:    06/23/20 0853   BP: 114/75   Pulse: 88   Resp: 20   Temp: 98.6  F (37  C)   SpO2: 98%     There is no height or weight on file to calculate BMI.    GENERAL APPEARANCE: alert, appears stated age, no acute distress  HEENT: Eyes grossly normal to inspection  RESP: lungs clear to auscultation - no rales, rhonchi, or wheezes  CV: regular rate and rhythm, no murmur, click, rub, or gallop  ABDOMEN: soft, Mild TTP epigastric region   MSK: extremities normal, no gross deformities noted, no lower extremity edema  NEURO: sensory exam grossly normal, mentation appears intact and speech normal  PSYCH: mood and affect normal

## 2020-06-23 NOTE — RESULT ENCOUNTER NOTE
Discussed with the patient in clinic.    Osmar Armando MD  [facility], PGY-3

## 2020-06-23 NOTE — PROGRESS NOTES
Preceptor Attestation:   Patient seen, evaluated and discussed with the resident. I have verified the content of the note, which accurately reflects my assessment of the patient and the plan of care.  Supervising Physician:Anne Nathan MD  [facility]

## 2020-06-24 ENCOUNTER — VIRTUAL VISIT (OUTPATIENT)
Dept: FAMILY MEDICINE | Facility: CLINIC | Age: 45
End: 2020-06-24
Payer: COMMERCIAL

## 2020-06-24 DIAGNOSIS — A04.8 H. PYLORI INFECTION: Primary | ICD-10-CM

## 2020-06-24 RX ORDER — TETRACYCLINE HYDROCHLORIDE 500 MG/1
500 CAPSULE ORAL 4 TIMES DAILY
Qty: 56 CAPSULE | Refills: 0 | Status: SHIPPED | OUTPATIENT
Start: 2020-06-24 | End: 2020-07-08

## 2020-06-24 RX ORDER — METRONIDAZOLE 250 MG/1
250 TABLET ORAL 4 TIMES DAILY
Qty: 56 TABLET | Refills: 0 | Status: SHIPPED | OUTPATIENT
Start: 2020-06-24 | End: 2020-07-08

## 2020-06-24 RX ORDER — BISMUTH SUBSALICYLATE 262 MG/1
1 TABLET, CHEWABLE ORAL
Qty: 56 TABLET | Refills: 0 | Status: SHIPPED | OUTPATIENT
Start: 2020-06-24 | End: 2020-07-08

## 2020-06-24 NOTE — PROGRESS NOTES
"Family Medicine Telephone Visit Note    Due to patient being non-English speaking/uses sign language, an  was used for this visit. Only for face-to-face interpretation by an external agency, date and length of interpretation can be found on the scanned worksheet.     name: Yanniee  Agency: [employer]  Language: Alexander   Telephone number:   Type of interpretation: Telephone, spoken           Telephone Visit Consent   Patient was verbally read the following and verbal consent was obtained.    \"Telephone visits are billed at different rates depending on your insurance coverage. During this emergency period, for some insurers they may be billed the same as an in-person visit.  Please reach out to your insurance provider with any questions.  If during the course of the call the physician/provider feels a telephone visit is not appropriate, you will not be charged for this service.\"    Name person giving consent:  Patient   Date verbal consent given:  6/24/2020  Time verbal consent given:  11:02 AM       Follow-up ulcer    Due to patient being non-English speaking/uses sign language, an  was used for this visit. Only for face-to-face interpretation by an external agency, date and length of interpretation can be found on the scanned worksheet.     name: Nalee  Agency: [employer]  Language: Alexander   Telephone number:   Type of interpretation: Telephone, spoken         HPI   Patients name: Jimmie  Appointment start time:  11:14 AM    Was seen yesterday by Dr Armando for fatigue  Was given medication for an ulcer but does not feel like it is working   6/8/20 started medication but this was just Pepcid and potassium  Started on therapy for h pylori on 6/12/20 -> has completed 12 days but per Dr Armando's note, unsure compliance  Today she cannot tell me which medications she takes but that she takes them twice daily  Continued epigastric pain and burning   +nausea  No " "melena        Current Outpatient Medications   Medication Sig Dispense Refill     amoxicillin (AMOXIL) 500 MG capsule Take 2 capsules (1,000 mg) by mouth 2 times daily for 14 days 56 capsule 0     clarithromycin (BIAXIN) 500 MG tablet Take 1 tablet (500 mg) by mouth 2 times daily for 14 days 28 tablet 0     famotidine (PEPCID) 20 MG tablet Take 1 tablet (20 mg) by mouth 2 times daily 60 tablet 1     omeprazole (PRILOSEC) 20 MG DR capsule Take 1 capsule (20 mg) by mouth 2 times daily for 14 days 28 capsule 0     ondansetron (ZOFRAN-ODT) 4 MG ODT tab Take 1 tablet (4 mg) by mouth every 8 hours as needed for nausea 30 tablet 0     potassium chloride ER (KLOR-CON M) 20 MEQ CR tablet Take 1 tablet (20 mEq) by mouth daily 90 tablet 1     Allergies   Allergen Reactions     No Known Allergies               Review of Systems:     CONSTITUTIONAL: NEGATIVE for fever, chills, change in weight  RESP: NEGATIVE for significant cough or SOB  CV: NEGATIVE for chest pain, palpitations or peripheral edema  GI: see HPI         Physical Exam:     There were no vitals taken for this visit.  Estimated body mass index is 30.64 kg/m  as calculated from the following:    Height as of 11/20/19: 1.5 m (4' 11.06\").    Weight as of 11/20/19: 68.9 kg (152 lb).    Exam:  Constitutional: healthy, alert and no distress  Psychiatric: mentation appears normal and affect normal/bright          Assessment and Plan   (A04.8) H. pylori infection  (primary encounter diagnosis)  Comment: will switch regimen as she may have h pylori resistant to macrolide antibiotic; switched to quad therapy w/ tetracycline, metronidazole, bismuth, and omeprazole; will take x 14 days; f/u then  Plan: tetracycline (ACHROMYCIN/SUMYCIN) 500 MG         capsule, metroNIDAZOLE (FLAGYL) 250 MG tablet,         bismuth subsalicylate (PEPTO BISMOL) 262 MG         chewable tablet          After Visit Information:  Will print and mail AVS     Follow-up in 2 weeks    Appointment end " time: 12:00 PM  This is a telephone visit that took >30 minutes. Call was disconnected as our  dropped off the call approx 20 minutes into the call. It was completed via the language line.    Clinician location:  [facility] Kapur, MD  June 24, 2020  12:01 PM

## 2020-06-25 ENCOUNTER — TELEPHONE (OUTPATIENT)
Dept: FAMILY MEDICINE | Facility: CLINIC | Age: 45
End: 2020-06-25

## 2020-06-25 NOTE — TELEPHONE ENCOUNTER
MTM referral from: [facility] visit (referral by provider)    MTM referral outreach attempt #2 on June 25, 2020 at 10:23 AM      Outcome: Patient not reachable after several attempts, will route to MTM Pharmacist/Provider as an FYI. Thank you for the referral.    Caro Mahan, MTM Coordinator

## 2020-06-30 ENCOUNTER — VIRTUAL VISIT (OUTPATIENT)
Dept: FAMILY MEDICINE | Facility: CLINIC | Age: 45
End: 2020-06-30
Payer: COMMERCIAL

## 2020-06-30 DIAGNOSIS — K59.00 CONSTIPATION, UNSPECIFIED CONSTIPATION TYPE: Primary | ICD-10-CM

## 2020-06-30 RX ORDER — POLYETHYLENE GLYCOL 3350 17 G/17G
1 POWDER, FOR SOLUTION ORAL DAILY
Qty: 578 G | Refills: 1 | Status: SHIPPED | OUTPATIENT
Start: 2020-06-30 | End: 2021-04-21

## 2020-06-30 NOTE — NURSING NOTE
Due to patient being non-English speaking/uses sign language, an  was used for this visit. Only for face-to-face interpretation by an external agency, date and length of interpretation can be found on the scanned worksheet.     name: Morena   Agency: [employer]  Language: Tonyong   Telephone number: [phone]   Type of interpretation: Telephone, spoken

## 2020-06-30 NOTE — PROGRESS NOTES
"Family Medicine Telephone Visit Note         Telephone Visit Consent   Patient was verbally read the following and verbal consent was obtained.    \"Telephone visits are billed at different rates depending on your insurance coverage. During this emergency period, for some insurers they may be billed the same as an in-person visit.  Please reach out to your insurance provider with any questions.  If during the course of the call the physician/provider feels a telephone visit is not appropriate, you will not be charged for this service.\"    Name person giving consent:  Patient   Date verbal consent given:  6/30/2020  Time verbal consent given:  8:50 AM         HPI   Patients name: Jimmie  Appointment start time: 9:02 AM    H. Pylori:  Abdominal pain is a little bit better, has some nausea after taking pills but no vomiting. Her appetite has continued to be poor and \"bland\". She is drinking water.    Constipation:  Started recently, is not taking anything at home for this.    Hypokalemia:  Asked about her potassium several times (was 3.5 on 6/23), wondering if this can be rechecked since it was borderline low.    Medication Adherence:  She was prescribed triple therapy for H. Pylori on 6/12. Seen again 6/23 and 6/24, some concern that she wasn't taking all of the pills correctly, but thinking maybe her infection was resistant to triple therapy, she was switched to quadruple therapy on 6/24 (to finish on 7/8). She cannot read English so does not know which pills she is taking. She is taking two pills twice daily, but later says she is actually taking some of these four times per day. She says there are only a few pills left in some of the bottles. Her son helps her with her medications sometimes (he speaks English) but he has to work often.     Current Outpatient Medications   Medication Sig Dispense Refill     bismuth subsalicylate (PEPTO BISMOL) 262 MG chewable tablet Take 1 tablet (262 mg) by mouth 4 times daily (before " "meals and nightly) for 14 days 56 tablet 0     famotidine (PEPCID) 20 MG tablet Take 1 tablet (20 mg) by mouth 2 times daily 60 tablet 1     metroNIDAZOLE (FLAGYL) 250 MG tablet Take 1 tablet (250 mg) by mouth 4 times daily for 14 days 56 tablet 0     ondansetron (ZOFRAN-ODT) 4 MG ODT tab Take 1 tablet (4 mg) by mouth every 8 hours as needed for nausea 30 tablet 0     potassium chloride ER (KLOR-CON M) 20 MEQ CR tablet Take 1 tablet (20 mEq) by mouth daily 90 tablet 1     tetracycline (ACHROMYCIN/SUMYCIN) 500 MG capsule Take 1 capsule (500 mg) by mouth 4 times daily for 14 days 56 capsule 0     Allergies   Allergen Reactions     No Known Allergies             Review of Systems:     Constitutional, HEENT, cardiovascular, pulmonary, gi and gu systems are negative, except as otherwise noted.         Physical Exam:     There were no vitals taken for this visit.  Estimated body mass index is 30.64 kg/m  as calculated from the following:    Height as of 11/20/19: 1.5 m (4' 11.06\").    Weight as of 11/20/19: 68.9 kg (152 lb).    Exam:  Constitutional: healthy, alert and no distress  Psychiatric: mentation appears normal and affect normal/bright        Assessment and Plan   H. Pylori & Medication Adherence:  Continue quadruple therapy (set to end on 7/8), though suspect she is not able to take these pills correctly due to not being able to read english. Will be able to better assess at in-person clinic visit if she brings her pill bottles, and will have help from pharmacy.    Constipation:  Start miralax 1 capful daily.     Hypokalemia:  Will recheck BMP at next in-person clinic visit.     Scheduled in-person visit with Dr. Dangelo on Friday, July 10th at 10:40 AM to recheck potassium and evaluate medications.    After Visit Information:  none today    Appointment end time: 9:32 AM  This is a telephone visit that took 31 minutes.    Rea Perez, MS4  [employer]    Clinician location: " [facility]    In supervising the medical student, I saw and evaluated the patient with the student and personally performed all aspects of the history and physical.  I have reviewed and verified that the documentation is correct and complete.  Direct time spent with patient: 14 minutes    Aarti Jennings MD

## 2020-07-01 ENCOUNTER — VIRTUAL VISIT (OUTPATIENT)
Dept: FAMILY MEDICINE | Facility: CLINIC | Age: 45
End: 2020-07-01
Payer: COMMERCIAL

## 2020-07-01 DIAGNOSIS — R21 RASH: ICD-10-CM

## 2020-07-01 DIAGNOSIS — A04.8 H. PYLORI INFECTION: Primary | ICD-10-CM

## 2020-07-01 NOTE — PROGRESS NOTES
"Family Medicine Telephone Visit Note           Telephone Visit Consent   Patient was verbally read the following and verbal consent was obtained.    \"Telephone visits are billed at different rates depending on your insurance coverage. During this emergency period, for some insurers they may be billed the same as an in-person visit.  Please reach out to your insurance provider with any questions.  If during the course of the call the physician/provider feels a telephone visit is not appropriate, you will not be charged for this service.\"    Name person giving consent:  Patient   Date verbal consent given:  7/1/2020  Time verbal consent given:  2:29 PM           Chief Complaint   Patient presents with     Derm Problem       Due to patient being non-English speaking/uses sign language, an  was used for this visit. Only for face-to-face interpretation by an external agency, date and length of interpretation can be found on the scanned worksheet.     name: Casi  Agency: [employer]  Language: Tonyong   Telephone number: [phone]  Type of interpretation: Face-to-face, spoken         HPI   Patients name: Jimmie  Appointment start time:  3:06 PM    1. Rash behind the neck and extends up to the back of the head. Started 1 week ago.   --No new hair products/soaps/dyes/detergents  --Itchy, not painful  --Her  told her it looks like little pimples. No drainage from the area  --No fever, no chills  --Hasn't tried any OTC meds or lotion. It's hard to treat because it's located under her hair.   --No one else at home has a rash    2. Reports history of ulcer for 1-2 years. Flared up 1 month ago and she was \"prescribed medication but it's not getting better.\"   --Just had telephone visit with Dr. Jennings about this yesterday  --Impression at that time was that she wasn't compliant with H pylori treatment because she can't read the English on the med bottles  --Supposed to be finishing quadruple therapy on " "7/8  --Plan was to bring in all bottles to an in person visit- no visit is on the future schedule          Current Outpatient Medications   Medication Sig Dispense Refill     bismuth subsalicylate (PEPTO BISMOL) 262 MG chewable tablet Take 1 tablet (262 mg) by mouth 4 times daily (before meals and nightly) for 14 days 56 tablet 0     famotidine (PEPCID) 20 MG tablet Take 1 tablet (20 mg) by mouth 2 times daily 60 tablet 1     metroNIDAZOLE (FLAGYL) 250 MG tablet Take 1 tablet (250 mg) by mouth 4 times daily for 14 days 56 tablet 0     ondansetron (ZOFRAN-ODT) 4 MG ODT tab Take 1 tablet (4 mg) by mouth every 8 hours as needed for nausea 30 tablet 0     polyethylene glycol (MIRALAX) 17 GM/SCOOP powder Take 17 g (1 capful) by mouth daily 578 g 1     potassium chloride ER (KLOR-CON M) 20 MEQ CR tablet Take 1 tablet (20 mEq) by mouth daily 90 tablet 1     tetracycline (ACHROMYCIN/SUMYCIN) 500 MG capsule Take 1 capsule (500 mg) by mouth 4 times daily for 14 days 56 capsule 0     Allergies   Allergen Reactions     No Known Allergies               Review of Systems:              Physical Exam:     There were no vitals taken for this visit.  Estimated body mass index is 30.64 kg/m  as calculated from the following:    Height as of 11/20/19: 1.5 m (4' 11.06\").    Weight as of 11/20/19: 68.9 kg (152 lb).    Exam:  Constitutional: healthy, alert and no distress  Psychiatric: mentation appears normal and affect normal/bright            Assessment and Plan     1. H. pylori infection  Per Dr. Jennings's last note, there is concern for patient's ability to understand the directions for her quadruple therapy. This could be the reason that her pain is persisting. If she correctly takes this therapy and still has pain, could pursue EGD. I agree with Dr. Jennings's assessment that an in person visit where patient brings all her meds would be very helpful- I will have staff help her schedule this at which time we can look at her rash too. "     2. Rash  Hard for me to determine etiology over the phone and therefore I think she needs in person appt. No sign that she needs emergent evaluation today. Staff will call her to schedule appt tomorrow or Friday.       Refilled medications that would be required in the next 3 months.     After Visit Information:  Patient declined AVS     No follow-ups on file.    Appointment end time: 3:26 PM  This is a telephone visit that took 20 minutes.      Clinician location:  [facility]     Mary Taylor MD

## 2020-07-02 ENCOUNTER — OFFICE VISIT (OUTPATIENT)
Dept: FAMILY MEDICINE | Facility: CLINIC | Age: 45
End: 2020-07-02
Payer: COMMERCIAL

## 2020-07-02 VITALS
HEART RATE: 84 BPM | SYSTOLIC BLOOD PRESSURE: 104 MMHG | WEIGHT: 142 LBS | BODY MASS INDEX: 28.63 KG/M2 | OXYGEN SATURATION: 98 % | DIASTOLIC BLOOD PRESSURE: 72 MMHG | HEIGHT: 59 IN | RESPIRATION RATE: 16 BRPM | TEMPERATURE: 98.6 F

## 2020-07-02 DIAGNOSIS — R21 RASH AND NONSPECIFIC SKIN ERUPTION: Primary | ICD-10-CM

## 2020-07-02 DIAGNOSIS — A04.8 H. PYLORI INFECTION: ICD-10-CM

## 2020-07-02 DIAGNOSIS — E87.6 HYPOKALEMIA: ICD-10-CM

## 2020-07-02 LAB
BUN SERPL-MCNC: 5 MG/DL (ref 5–24)
CALCIUM SERPL-MCNC: 9.4 MG/DL (ref 8.5–10.4)
CHLORIDE SERPLBLD-SCNC: 102 MMOL/L (ref 94–109)
CO2 SERPL-SCNC: 24 MMOL/L (ref 20–32)
CREAT SERPL-MCNC: 0.7 MG/DL (ref 0.6–1.3)
EGFR CALCULATED (BLACK REFERENCE): >90 ML/MIN
EGFR CALCULATED (NON BLACK REFERENCE): >90 ML/MIN
GLUCOSE SERPL-MCNC: 252 MG/DL (ref 60–109)
POTASSIUM SERPL-SCNC: 3.2 MMOL/L (ref 3.4–5.3)
SODIUM SERPL-SCNC: 137 MMOL/L (ref 133–144)

## 2020-07-02 RX ORDER — TRIAMCINOLONE ACETONIDE 0.25 MG/G
OINTMENT TOPICAL 2 TIMES DAILY
Qty: 80 G | Refills: 0 | Status: SHIPPED | OUTPATIENT
Start: 2020-07-02 | End: 2020-07-16

## 2020-07-02 ASSESSMENT — MIFFLIN-ST. JEOR: SCORE: 1192.48

## 2020-07-02 NOTE — PROGRESS NOTES
Preceptor Attestation:   Patient seen, evaluated and discussed with the resident. I have verified the content of the note, which accurately reflects my assessment of the patient and the plan of care.    Supervising Physician:Troy Lopez MD    [facility]

## 2020-07-02 NOTE — NURSING NOTE
"Chief Complaint   Patient presents with     Derm Problem     upper neck and scalp area for about one week. Itchy.     Medication Reconciliation     completed.        /72   Pulse 84   Temp 98.6  F (37  C) (Oral)   Resp 16   Ht 1.487 m (4' 10.54\")   Wt 64.4 kg (142 lb)   LMP 06/30/2020   SpO2 98%   BMI 29.13 kg/m        ~ Kareem Dale CMA ([place]rissi)  [facility]  Phone: [phone]    Due to patient being non-English speaking/uses sign language, an  was used for this visit. Only for face-to-face interpretation by an external agency, date and length of interpretation can be found on the scanned worksheet.     name: Magi Aceves  Agency: [employer]  Language: Alexander   Telephone number: [phone]  Type of interpretation: Telephone, spoken      "

## 2020-07-02 NOTE — PROGRESS NOTES
Subjective:   Jimmie Barrientos is a 45 year old year old female with history of T2DM, obesity, recently diagnosed H pylori infection (6/10/2020) who presents to clinic today for the following health issues:    Rash:  For the past week, she has had itching along her posterior scalp. Washes her hair every other day, no changes in shampoo/other hair products/detergents, etc. Not spending a lot of time outside or in the sun. No systemic symptoms. When her  looked at her scalp, he saw small red bumps but said they were hard to see. She has not had a similar rash in the past. Her kids have had lice during the school year but have been treated in the past.     H. Pylori/Med Adherence:                    Diagnosed with H. Pylori and prescribed triple therapy on 6/12. Seen again on 6/24, some concern that she wasn't taking all of the pills correctly, but thinking maybe her infection was resistant to triple therapy, she was switched to quadruple therapy (to finish on 7/8). She cannot read English or Hmong so does not know exactly which pills she is taking and how often. Her son helps her with her medications sometimes (he speaks English) but he has to work often.     Brought in meds today. NOT TAKING: clarithromycin, amoxicillin, omeprazole. TAKING: famotidine, potassium, tetracycline, pepto bismol.     Abdominal pain is much better. Has taken zofran once, helped her feel better, so she stopped.    Constipation:   Much better, using miralax.    Hypokalemia:   Wants potassium rechecked today.     A Demdex  was used for this visit.    PMHX:     Patient Active Problem List   Diagnosis     Hepatitis B carrier (H)     Pap smear for cervical cancer screening     Type 2 diabetes mellitus without complication, without long-term current use of insulin (H)     Urinary tract stones     Chronic allergic conjunctivitis     Class 1 obesity with body mass index (BMI) of 31.0 to 31.9 in adult     Hypokalemia     Fatigue,  "unspecified type     H. pylori infection       Current Outpatient Medications   Medication Sig Dispense Refill     bismuth subsalicylate (PEPTO BISMOL) 262 MG chewable tablet Take 1 tablet (262 mg) by mouth 4 times daily (before meals and nightly) for 14 days 56 tablet 0     famotidine (PEPCID) 20 MG tablet Take 1 tablet (20 mg) by mouth 2 times daily 60 tablet 1     ondansetron (ZOFRAN-ODT) 4 MG ODT tab Take 1 tablet (4 mg) by mouth every 8 hours as needed for nausea 30 tablet 0     polyethylene glycol (MIRALAX) 17 GM/SCOOP powder Take 17 g (1 capful) by mouth daily 578 g 1     potassium chloride ER (KLOR-CON M) 20 MEQ CR tablet Take 1 tablet (20 mEq) by mouth daily 90 tablet 1     tetracycline (ACHROMYCIN/SUMYCIN) 500 MG capsule Take 1 capsule (500 mg) by mouth 4 times daily for 14 days 56 capsule 0     triamcinolone (KENALOG) 0.025 % external ointment Apply topically 2 times daily for 14 days 80 g 0     metroNIDAZOLE (FLAGYL) 250 MG tablet Take 1 tablet (250 mg) by mouth 4 times daily for 14 days 56 tablet 0     Social History     Tobacco Use     Smoking status: Never Smoker     Smokeless tobacco: Never Used   Substance Use Topics     Alcohol use: No     Alcohol/week: 0.0 standard drinks     Drug use: No     Allergies   Allergen Reactions     No Known Allergies        Review of Systems:     Constitutional, HEENT, cardiovascular, pulmonary, GI, musculoskeletal, neuro, skin, and psych systems are negative, except as otherwise noted.     Objective:     /72   Pulse 84   Temp 98.6  F (37  C) (Oral)   Resp 16   Ht 1.487 m (4' 10.54\")   Wt 64.4 kg (142 lb)   LMP 06/30/2020   SpO2 98%   BMI 29.13 kg/m    Body mass index is 29.13 kg/m .  GENERAL APPEARANCE: healthy, alert and no distress,  EYES: Eyes grossly normal to inspection,  PERRL  RESP: normal respiratory rate, normal work of breathing on room air  SKIN: Punctate erythematous lesions along posterior portion of scalp. No lice or other bugs seen. No " flaking skin, no dandruff. No excoriations or rash on the hands.  NEURO: mentation appears normal, normal speech, normal gait  PSYCH: mood and affect bright/normal    Diagnostic Test Results:  Results for orders placed or performed in visit on 07/02/20 (from the past 24 hour(s))   Basic Metabolic Panel ([facility]) - Results < 1 hr   Result Value Ref Range    Glucose 252.0 (H) 60.0 - 109.0 mg/dL    Urea Nitrogen 5.0 5.0 - 24.0 mg/dL    Creatinine 0.7 0.6 - 1.3 mg/dL    Sodium 137.0 133.0 - 144.0 mmol/L    Potassium 3.2 (L) 3.4 - 5.3 mmol/L    Chloride 102.0 94.0 - 109.0 mmol/L    Carbon Dioxide 24.0 20.0 - 32.0 mmol/L    Calcium 9.4 8.5 - 10.4 mg/dL    eGFR Calculated (Non Black Reference) >90 >60.0 mL/min    eGFR Calculated (Black Reference) >90 >60.0 mL/min       Assessment & Plan:     1. Rash and nonspecific skin eruption  Appears to be caused by small bug bites, not consistent with lice, dandruff, scabies, or tinea. Will send triamcinolone ointment to be applied topically twice daily as needed for itching.  - triamcinolone (KENALOG) 0.025 % external ointment; Apply topically 2 times daily for 14 days  Dispense: 80 g; Refill: 0    2. Hypokalemia  Was last 3.5 on 6/23; recheck today 3.2. Asymptomatic. Some question on whether patient has been compliant with her supplement - explicitly reviewed that she needs to take daily.  - Basic Metabolic Panel ([facility]) - Results < 1 hr    3. H. pylori infection  Requested that she come back to clinic on Monday (needs to find Flagyl bottle at home) to meet with pharmacist in clinic to help set up a system for understanding which medications to take and when. Recommend she stop all pills except her potassium supplement. Due to inconsistency and misunderstandings with medications, she will likely need to restart quadruple therapy once we have a better organizational system in place for her, to have the best chance at eradicating H. Pylori.    Options for treatment and follow-up care  were reviewed with the patient and/or guardian. Jimmie Barrientos and/or guardian engaged in the decision making process and verbalized understanding of the options discussed and agreed with the final plan.    Rea Perez, MS4  [employer]    I was present with the medical student who participated in the service and in the documentation of this note. I have verified the history and personally performed the physical exam and medical decision making, and have verified the content of the note, which accurately reflects my assessment of the patient and the plan of care.    La Moreno MD  [facility] Resident    Precepted with: Troy Lopez MD

## 2020-07-02 NOTE — PATIENT INSTRUCTIONS
Please bring these medicines back to clinic on Monday (July 6) for your visit with our pharmacist, Jaki:    OMEPRAZOLE (Prilosec)  METRONIDAZOLE (Flagyl)  TETRACYCLINE   BISMUTH SUBSALICYLATE (Pepto bismol)    Continue to take your potassium medicine.

## 2020-07-07 ENCOUNTER — TELEPHONE (OUTPATIENT)
Dept: FAMILY MEDICINE | Facility: CLINIC | Age: 45
End: 2020-07-07

## 2020-07-07 NOTE — TELEPHONE ENCOUNTER
----- Message from Jaki Maravilla [facility] sent at 7/6/2020  4:23 PM CDT -----  Can you call patient and see if she is willing to reschedule with me? I see that she made an appt today for Friday w/ Dr. Angela Ceron for her stomach but we need to get her meds in order to help her stomach! I should have availability tomorrow-Friday to see her ASAP.     Thanks!  Jaki Cortez, PharmD, CDCES (previously, CDE)  [facility]  Phone: [phone]  July 6, 2020 at 4:25 PM

## 2020-08-24 ENCOUNTER — TELEPHONE (OUTPATIENT)
Dept: FAMILY MEDICINE | Facility: CLINIC | Age: 45
End: 2020-08-24

## 2020-08-24 NOTE — TELEPHONE ENCOUNTER
----- Message from Jaik Maravilla [facility] sent at 8/21/2020 10:32 AM CDT -----  Can you call pt to see if able to reschedule appt w/ myself and physician for covisit?    Thanks so much,   Jaki Cortez, PharmD, CDCES (previously, CDE)  [facility]  Phone: [phone]  August 21, 2020 at 10:32 AM

## 2020-08-26 NOTE — TELEPHONE ENCOUNTER
Per patient her mother recently passed away and they still have to do a burial. She states she will call back once she is available.

## 2020-11-17 DIAGNOSIS — E87.6 HYPOKALEMIA: ICD-10-CM

## 2020-11-17 NOTE — TELEPHONE ENCOUNTER
[facility] phone call message- medication clarification/question:    Full Medication Name: potassium chloride ER (KLOR-CON M)     Dose: 20 MEQ    Question: Patient would like to get refill on medication     Pharmacy confirmed as [facility] - [place], [place] - [address]: Yes    OK to leave a message on voice mail? Yes    Primary language: ong      needed? Yes    Call taken on November 17, 2020 at 10:26 AM by Wisam Dale

## 2020-11-18 RX ORDER — POTASSIUM CHLORIDE 1500 MG/1
20 TABLET, EXTENDED RELEASE ORAL DAILY
Qty: 90 TABLET | Refills: 1 | Status: SHIPPED | OUTPATIENT
Start: 2020-11-18 | End: 2021-06-22

## 2021-01-08 NOTE — LETTER
September 13, 2019      Jimmie Barrientos  [address] [place] [address]        Dear Jimmie,    All labs within normal limits.   Please follow up in 3 months for diabetes check    Please see below for your test results.    Resulted Orders   Hemoglobin A1c ([facility])   Result Value Ref Range    Hemoglobin A1C 6.6 (H) 4.1 - 5.7 %   Basic Metabolic Panel ([facility]) - Results < 1 hr   Result Value Ref Range    Glucose 152.0 (H) 60.0 - 109.0 mg/dL    Urea Nitrogen 10.0 5.0 - 24.0 mg/dL    Creatinine 0.5 (L) 0.6 - 1.3 mg/dL    Sodium 140.0 133.0 - 144.0 mmol/L    Potassium 3.2 (L) 3.4 - 5.3 mmol/L    Chloride 103.0 94.0 - 109.0 mmol/L    Carbon Dioxide 27.0 20.0 - 32.0 mmol/L    Calcium 8.9 8.5 - 10.4 mg/dL    eGFR Calculated (Non Black Reference) >90 >60.0 mL/min    eGFR Calculated (Black Reference) >90 >60.0 mL/min   Lipid Panel ([facility]) - Results < 1 hr   Result Value Ref Range    Cholesterol 143.0 <200.0 mg/dL    Triglycerides 100.0 <150.0 mg/dL    HDL Cholesterol 52.0 >50.0 mg/dL      Comment:      If diabetic or CVD then reference range <100    VLDL-Cholesterol 20.0 7.0 - 32.0 mg/dL    LDL Cholesterol Direct 71.0 0.0 - 99.0 mg/dL    Cholesterol/HDL Ratio 2.7 <5.0 RATIO   Thyroid [facility] ([facility])   Result Value Ref Range    TSH 1.38 0.30 - 5.00 uIU/mL    Narrative    Test performed by:  [facility]  [address] [place] [address]       If you have any questions, please call the clinic to make an appointment.    Sincerely,    Lyndsey Ceron MD  
impairments found/risk reduction/prevention

## 2021-02-09 ENCOUNTER — OFFICE VISIT (OUTPATIENT)
Dept: FAMILY MEDICINE | Facility: CLINIC | Age: 46
End: 2021-02-09
Payer: COMMERCIAL

## 2021-02-09 VITALS
HEART RATE: 88 BPM | DIASTOLIC BLOOD PRESSURE: 77 MMHG | OXYGEN SATURATION: 98 % | SYSTOLIC BLOOD PRESSURE: 120 MMHG | WEIGHT: 150 LBS | TEMPERATURE: 98.1 F | BODY MASS INDEX: 30.77 KG/M2 | RESPIRATION RATE: 20 BRPM

## 2021-02-09 DIAGNOSIS — E11.9 TYPE 2 DIABETES MELLITUS WITHOUT COMPLICATION, WITHOUT LONG-TERM CURRENT USE OF INSULIN (H): Primary | ICD-10-CM

## 2021-02-09 DIAGNOSIS — E87.6 HYPOKALEMIA: ICD-10-CM

## 2021-02-09 LAB
ALBUMIN SERPL BCP-MCNC: 3.5 G/DL (ref 3.5–5)
ALP SERPL-CCNC: 80 U/L (ref 45–120)
ALT SERPL W/O P-5'-P-CCNC: 19 U/L (ref 0–45)
AST SERPL-CCNC: 19 U/L (ref 0–40)
BILIRUB DIRECT SERPL-MCNC: 0.2 MG/DL (ref 0–0.5)
BILIRUB SERPL-MCNC: 0.3 MG/DL (ref 0–1)
BUN SERPL-MCNC: 12 MG/DL (ref 5–24)
CALCIUM SERPL-MCNC: 8.8 MG/DL (ref 8.5–10.4)
CHLORIDE SERPLBLD-SCNC: 101 MMOL/L (ref 94–109)
CO2 SERPL-SCNC: 25 MMOL/L (ref 20–32)
CREAT SERPL-MCNC: 0.4 MG/DL (ref 0.6–1.3)
EGFR CALCULATED (BLACK REFERENCE): >90 ML/MIN
EGFR CALCULATED (NON BLACK REFERENCE): >90 ML/MIN
GLUCOSE SERPL-MCNC: 201 MG/DL (ref 60–109)
HBA1C MFR BLD: 7.2 % (ref 4.1–5.7)
MAGNESIUM SERPL-MCNC: 1.7 MG/DL (ref 1.8–2.6)
POTASSIUM SERPL-SCNC: 3.5 MMOL/L (ref 3.4–5.3)
PROT SERPL-MCNC: 6.9 G/DL (ref 6–8)
SODIUM SERPL-SCNC: 138 MMOL/L (ref 133–144)

## 2021-02-09 PROCEDURE — 80048 BASIC METABOLIC PNL TOTAL CA: CPT | Performed by: STUDENT IN AN ORGANIZED HEALTH CARE EDUCATION/TRAINING PROGRAM

## 2021-02-09 PROCEDURE — 83036 HEMOGLOBIN GLYCOSYLATED A1C: CPT | Performed by: STUDENT IN AN ORGANIZED HEALTH CARE EDUCATION/TRAINING PROGRAM

## 2021-02-09 PROCEDURE — 36415 COLL VENOUS BLD VENIPUNCTURE: CPT | Performed by: STUDENT IN AN ORGANIZED HEALTH CARE EDUCATION/TRAINING PROGRAM

## 2021-02-09 PROCEDURE — 99213 OFFICE O/P EST LOW 20 MIN: CPT | Mod: GC | Performed by: STUDENT IN AN ORGANIZED HEALTH CARE EDUCATION/TRAINING PROGRAM

## 2021-02-09 NOTE — PATIENT INSTRUCTIONS
High-potassium content foods  Highest content (>25 mEq/100 g)   Dried figs   Molasses   Seaweed   Very high content (>12.5 mEq/100 g)   Dried fruits (dates, prunes)   Nuts   Avocados   Bran cereals   Wheat germ   Lima beans    High content (>6.2 mEq/100 g)   Vegetables   Spinach   Tomatoes   Broccoli   Winter squash   Beets   Carrots   Cauliflower   Potatoes   Fruits   Bananas   Cantaloupe   Kiwis   Oranges   Mangos   Meats   Ground beef   Steak   Pork   Veal   Gorman

## 2021-02-09 NOTE — PROGRESS NOTES
HPI:       Jimmie Barrientos is a 45 year old  female who presents for:    1. Hypokalemia  Pt has a history of 2 years of hypokalemia and is here for a refill of her potassium supplementation. Her ultimate goal is to stop taking the potassium supplementation. She has been taking her potassium supplementation inconsistently twice a day for one week with breaks in between. She is able to exercise fine but just feels weak overall without the medication. She reports some instances of heart palpitations which occur at rest and during exercise but it is nonspecific. She has also had a decreased appetite and has reported she lost about 20# over the past year (though per chart review her weight is stable since 2019). Her diet consists of meat, vegetables, and some fruit. She has noticed some muscle twitching but no cramping. Pt does not report nausea, vomiting, diarrhea, herbal supplements, lightheadedness, dizziness, dysuria, or polyuria.     A [place]  was used for this visit         PMHX:     Patient Active Problem List   Diagnosis     Hepatitis B carrier (H)     Pap smear for cervical cancer screening     Type 2 diabetes mellitus without complication, without long-term current use of insulin (H)     Urinary tract stones     Chronic allergic conjunctivitis     Class 1 obesity with body mass index (BMI) of 31.0 to 31.9 in adult     Hypokalemia     Fatigue, unspecified type     H. pylori infection       Current Outpatient Medications   Medication Sig Dispense Refill     famotidine (PEPCID) 20 MG tablet Take 1 tablet (20 mg) by mouth 2 times daily 60 tablet 1     ondansetron (ZOFRAN-ODT) 4 MG ODT tab Take 1 tablet (4 mg) by mouth every 8 hours as needed for nausea 30 tablet 0     polyethylene glycol (MIRALAX) 17 GM/SCOOP powder Take 17 g (1 capful) by mouth daily 578 g 1     potassium chloride ER (KLOR-CON M) 20 MEQ CR tablet Take 1 tablet (20 mEq) by mouth daily 90 tablet 1       Social History:   Relationships    Social connections     Talks on phone: Not on file     Gets together: Not on file     Attends [employer] service: Not on file     Active member of club or organization: Not on file     Attends meetings of clubs or organizations: Not on file     Relationship status: Not on file       Family History:   Family History   Problem Relation Age of Onset     Family History Negative Other      Diabetes Mother      Diabetes Brother      Coronary Artery Disease No family hx of      Breast Cancer No family hx of      Colon Cancer No family hx of      Prostate Cancer No family hx of      Other Cancer No family hx of           Allergies   Allergen Reactions     No Known Allergies        No results found for this or any previous visit (from the past 24 hour(s)).         Review of Systems:   10 point ROS negative except noted in above in HPI         Physical Exam:     Vitals:    02/09/21 0906   BP: 120/77   Pulse: 88   Resp: 20   Temp: 98.1  F (36.7  C)   SpO2: 98%   Weight: 68 kg (150 lb)     Body mass index is 30.77 kg/m .    GENERAL APPEARANCE: healthy, alert and no distress,  EYES: Eyes grossly normal to inspection,  PERRL  HENT: ear canals and TM's normal and nose and mouth without ulcers or lesions  NECK: no adenopathy, no asymmetry, masses, or scars and thyroid normal to palpation  RESP: lungs clear to auscultation - no rales, rhonchi or wheezes  CV: regular rate and rhythm,  and no murmur, click,  rub or gallop  ABDOMEN: soft, nontender, without hepatosplenomegaly or masses  MS: extremities normal- no gross deformities noted      Assessment and Plan     1. Type 2 diabetes mellitus without complication, without long-term current use of insulin (H)  Pt has consistent fasting blood sugars over 200 mg/dL. No decreased pedal sensation with monofilament test. Will begin checking A1c every 3 months.   - Basic Metabolic Panel ([facility]) - Results < 1 hr  - Hemoglobin A1c ([facility])  - follow up at another visit to address diabetes   -  foot exam today     2. Hypokalemia  Pt potassium levels are wnl today. Etiology of hypokalemia is unknown. Will provide Pt with a list of potassium rich foods and continue potassium supplementation. Pt also re-educated to adhere closely to dosing of medications.  - Magnesium ([facility])  - Hepatic Profile ([facility])    - BMP  - List of potassium rich foods  - advised to take her daily potassium and follow up in 2 weeks     Options for treatment and follow-up care were reviewed with the patient and/or guardian. Jimmie Barrientos and/or guardian engaged in the decision making process and verbalized understanding of the options discussed and agreed with the final plan.    Seen and discussed with resident who will discuss with staff.    Marvin Barrientos, MS3  Medical Student    The medical student acted as a scribe and the encounter documented above was performed completely by me and the documentation accurately reflects the work I have performed today. MD Lyndsey Bernal MD   [facility] Resident   Pager: [phone]      Precepted today with: Vance Herrera MD

## 2021-02-11 DIAGNOSIS — E83.42 HYPOMAGNESEMIA: Primary | ICD-10-CM

## 2021-02-11 NOTE — RESULT ENCOUNTER NOTE
Team - please call patient with results.  Lokesh Smauel,   Your  Magnesium is slightly low, this is not uncommon to see with low potassium as well.   I have sent a magnesium supplement to your pharmacy to take daily.  Your Hgb A1c 7.2, this is elevated and means we need to have a visit to discuss starting diabetes medications.   - PANKAJ

## 2021-02-23 ENCOUNTER — OFFICE VISIT (OUTPATIENT)
Dept: FAMILY MEDICINE | Facility: CLINIC | Age: 46
End: 2021-02-23
Payer: COMMERCIAL

## 2021-02-23 VITALS
RESPIRATION RATE: 18 BRPM | HEART RATE: 86 BPM | BODY MASS INDEX: 29.95 KG/M2 | WEIGHT: 146 LBS | SYSTOLIC BLOOD PRESSURE: 99 MMHG | DIASTOLIC BLOOD PRESSURE: 68 MMHG | OXYGEN SATURATION: 97 %

## 2021-02-23 DIAGNOSIS — E87.6 HYPOKALEMIA: Primary | ICD-10-CM

## 2021-02-23 DIAGNOSIS — E66.811 CLASS 1 OBESITY DUE TO EXCESS CALORIES WITHOUT SERIOUS COMORBIDITY WITH BODY MASS INDEX (BMI) OF 31.0 TO 31.9 IN ADULT: ICD-10-CM

## 2021-02-23 DIAGNOSIS — E83.42 HYPOMAGNESEMIA: ICD-10-CM

## 2021-02-23 DIAGNOSIS — E11.9 TYPE 2 DIABETES MELLITUS WITHOUT COMPLICATION, WITHOUT LONG-TERM CURRENT USE OF INSULIN (H): ICD-10-CM

## 2021-02-23 DIAGNOSIS — E66.09 CLASS 1 OBESITY DUE TO EXCESS CALORIES WITHOUT SERIOUS COMORBIDITY WITH BODY MASS INDEX (BMI) OF 31.0 TO 31.9 IN ADULT: ICD-10-CM

## 2021-02-23 LAB — POTASSIUM SERPL-SCNC: 3.7 MMOL/L (ref 3.5–5)

## 2021-02-23 PROCEDURE — 36415 COLL VENOUS BLD VENIPUNCTURE: CPT | Performed by: STUDENT IN AN ORGANIZED HEALTH CARE EDUCATION/TRAINING PROGRAM

## 2021-02-23 PROCEDURE — 99214 OFFICE O/P EST MOD 30 MIN: CPT | Mod: GC | Performed by: STUDENT IN AN ORGANIZED HEALTH CARE EDUCATION/TRAINING PROGRAM

## 2021-02-23 NOTE — PROGRESS NOTES
"    Assessment & Plan     Hypokalemia  Likely due to diet. Last visit was at a normal level. Still not taking potassium regularly so did medication education today on taking daily meds and the importance. Rechecked potassium today   - Potassium ([facility])  - continue potassium supplement     Type 2 diabetes mellitus without complication, without long-term current use of insulin (H)  Class 1 Obesiet due to excess calories  A1c 7.2. every year her A1c increases and she declines medication. Last year had discussion about the risks of uncontrolled diabetes and she wanted to focus on diet and exercise. However since her A1c continues to increase, I recommend metformin. She declines and wants to focus on diet and exercise, weight loss  - discussed increasing her exercise to 30 min/day  - discussed 10% weight loss which would be about 14 lbs  - discussed cutting her portion of rice with each meal in half   - referral for diabetes education with [facility]    Hypomagnesemia  Was low at last visit 1.7. Rx was sent for supplement but she is not taking it. Again discussed importance of taking meds daily.   - follow up 4 weeks for recheck magnesium   - advised to take magnesium supplement daily       Review of the result(s) of each unique test - A1c, potassium, magnesium         BMI:   Estimated body mass index is 29.95 kg/m  as calculated from the following:    Height as of 7/2/20: 1.487 m (4' 10.54\").    Weight as of this encounter: 66.2 kg (146 lb).   Weight management plan: Discussed healthy diet and exercise guidelines    CONSULTATION/REFERRAL to Dr. Scottie Cortez for diabetes education       Return in about 4 weeks (around 3/23/2021) for with me.    Lyndsey Ceron MD  [facility]    Kwesi Samuel is a 45 year old who presents for the following health issues     HPI       Follow up on hypokalemia   - taking potassium supplement everyday, except for a few days the other week she forgot "   - has had work up for hypokalemia and no known cause, possibly due to poor potassium intake in diet and irregular potassium supplement use   - also has low magnesium diagnosed at last visit   - no leg cramping, does sometimes get occasional twitches in random muscles     Hypomagnesemia  - 1.7 magnesium at last visit   - magnesium supplement has been sent to pharmacy as well after last visit   - she did not know she was supposed to take it, still needs to  from the pharmacy     Diabetes   - last visit as 7.2, visit before that was 6.9  - not currently on medications for diabetes--previously discussed and declined medications at that time.   - she declines any diabetes medications right now. Said she just doesn't want to take medications. She is not worried about cost or side effect, just doesn't want to take meds  - she wants time to fix it on her own despite me discussing the risks of diabetes   - exercise: does some exercise bike about 10 min, every 1-2 days   - diet: every meal: rice, vegetable, and meat. Drinks water no other juice or pap or added sugar   - she is interested in trying to achieve weight loss       HCM  - declines flu shot. Said she has never gotten the flu shot and won't start today         Review of Systems   Noted in HPI      Objective    BP 99/68   Pulse 86   Resp 18   Wt 66.2 kg (146 lb)   SpO2 97%   BMI 29.95 kg/m    Body mass index is 29.95 kg/m .  Physical Exam   GENERAL: healthy, alert and no distress  RESP: lungs clear to auscultation - no rales, rhonchi or wheezes  CV: regular rate and rhythm, normal S1 S2, no S3 or S4, no murmur, click or rub, no peripheral edema and peripheral pulses strong  MS: no gross musculoskeletal defects noted, no edema  PSYCH: mentation appears normal, affect normal/bright    No results found for this or any previous visit (from the past 24 hour(s)).    ----- Service Performed and Documented by Resident or Fellow ------

## 2021-03-01 ENCOUNTER — OFFICE VISIT (OUTPATIENT)
Dept: PHARMACY | Facility: CLINIC | Age: 46
End: 2021-03-01
Payer: COMMERCIAL

## 2021-03-01 VITALS
DIASTOLIC BLOOD PRESSURE: 69 MMHG | TEMPERATURE: 98.2 F | WEIGHT: 148 LBS | SYSTOLIC BLOOD PRESSURE: 105 MMHG | OXYGEN SATURATION: 99 % | BODY MASS INDEX: 30.36 KG/M2 | RESPIRATION RATE: 18 BRPM | HEART RATE: 75 BPM

## 2021-03-01 DIAGNOSIS — E11.9 TYPE 2 DIABETES MELLITUS WITHOUT COMPLICATION, WITHOUT LONG-TERM CURRENT USE OF INSULIN (H): Primary | ICD-10-CM

## 2021-03-01 PROCEDURE — 99607 MTMS BY PHARM ADDL 15 MIN: CPT | Performed by: PHARMACIST

## 2021-03-01 PROCEDURE — 99605 MTMS BY PHARM NP 15 MIN: CPT | Performed by: PHARMACIST

## 2021-03-01 NOTE — PATIENT INSTRUCTIONS
Recommendations from today's visit:                                                      1. Work to increase the amount of vegetables on your plate with your meals. This will help decrease rice. Remember that we want to limit corn, potatoes and peas just like we limit rice  2. Start checking 2 hours after your meal. At this time we want the blood sugar to be lower than 180. Use these numbers to see how well the changes to your food are helping your blood sugar.     It was great to speak with you today!    I value your experience. You may receive a survey via email or text message in the next few days. I would be very thankful for your time in providing feedback.    Pharmacist's contact information:                                                      Please feel free to contact me with any questions or concerns you have.     Jaki Cortez  Pharmacist, Certified Diabetes Care and   [facility]  Phone: [phone]  March 1, 2021 at 9:58 AM

## 2021-03-01 NOTE — PROGRESS NOTES
Medication Therapy Management (MTM) Encounter    ASSESSMENT:                            DM: A1c above goal <7%. Identified areas of change in diet today. Have not yet assessed exercise. Not currently taking statin, need for future conversation about this. Aspirin not indicated based on low 10 year risk.   HypoK/Mg: Taking appropriately. Potassium controlled per recent lab check 2/23. Mag planned to be repeated at upcoming PCP appt, 3/24.     PLAN:                            1. Work to increase the amount of vegetables on your plate with your meals. This will help decrease rice. Remember that we want to limit corn, potatoes and peas just like we limit rice  2. Start checking 2 hours after your meal. At this time we want the blood sugar to be lower than 180. Use these numbers to see how well the changes to your food are helping your blood sugar.     Provided handout:         Follow-up: 2 weeks via telephone, scheduled today    Need: microalbumin repeat (last 2017), eye exam, need to revisit H pylori treatment (believe this is unresolved, incomplete)    SUBJECTIVE/OBJECTIVE:                          Jimmie Barrientos is a 45 year old female coming in for an initial visit. She was referred to me from Dr. Angela Ceron.  was present during today's visit.      Documentation:                  Due to patient being non-English speaking/uses sign language, an  was used for this visit. Only for face-to-face interpretation by an external agency, date and length of interpretation can be found on the scanned worksheet.     name: Neng - [id number]  Agency: [employer]  Language: Hmong   Type of interpretation: Telemedicine, spoken                      Reason for visit: diabetes management follow up.    Allergies/ADRs: Reviewed in chart  Social History     Tobacco Use     Smoking status: Never Smoker     Smokeless tobacco: Never Used   Substance Use Topics     Alcohol use: No      Alcohol/week: 0.0 standard drinks     Drug use: No     ^Reviewed today  Past Medical History: Reviewed in chart      Diabetes: Rice, meat, veggie, and fruits (mostly bananas). Is unsure what foods increase BG compared to others. Three meals per day. No juice pop or other sweetened beverages, last pop was many years ago. Prefers lean proteins - chicken, fish, turkey, little beef. Greens mustard, bok duke, long beans. Vegetable oil mostly when sir frying. Plain water only, no coffee or tea. Things to try to do differently, will try to implement plate method. Currently checking blood sugar. Fasting -130, checking after meal ~200, these postprandial checks about 1-1.5 hours. Checking pre and post breakfast time meal, thinks that breakfast meal maybe the largest however all very similar. Currently obtaining testing supplies from her  as he has excess. Interested in having her own.     Lab Results   Component Value Date    A1C 7.2 02/09/2021    A1C 6.9 06/08/2020    A1C 6.6 09/12/2019    A1C 6.5 06/29/2018    A1C 6.5 11/07/2017     The 10-year ASCVD risk score (Ames ZAYRA Jr., et al., 2013) is: 0.6%    Values used to calculate the score:      Age: 45 years      Sex: Female      Is Non- : No      Diabetic: Yes      Tobacco smoker: No      Systolic Blood Pressure: 105 mmHg      Is BP treated: No      HDL Cholesterol: 52 mg/dL      Total Cholesterol: 143 mg/dL    HypoK/Mg: Taking 1 tablet each day of potassium and magnesium. Taking after food to help lessen stomach upset. Taking with morning meal. No trouble swallowing potassium pill, breaks in half.       Today's Vitals: /69   Pulse 75   Temp 98.2  F (36.8  C) (Oral)   Resp 18   Wt 148 lb (67.1 kg)   SpO2 99%   BMI 30.36 kg/m    ----------------      I spent 30 minutes with this patient today. Dr. Angela Ceron was provided the recommendations above  via routed note and Dr. Jennings is the authorizing prescriber for this visit  through the pharmacist collaborative practice agreement.. A copy of the visit note was provided to the patient's primary care provider.    The patient was given a summary of these recommendations.     Jaki Cortez, PharmD, [facility] (previously, CDE)  [facility]  Phone: [phone]  March 1, 2021 at 10:12 AM    For insurance/tracking purposes, MTM Team Documentation:    Medication Therapy Recommendations  Type 2 diabetes mellitus without complication, without long-term current use of insulin (H)    Rationale: Preventive therapy - Needs additional medication therapy - Indication   Recommendation: Start Medication - history of diabetes indicates statin treatment   Status: Contact Provider - Awaiting Response

## 2021-03-17 ENCOUNTER — VIRTUAL VISIT (OUTPATIENT)
Dept: PHARMACY | Facility: CLINIC | Age: 46
End: 2021-03-17
Payer: COMMERCIAL

## 2021-03-17 DIAGNOSIS — E11.9 TYPE 2 DIABETES MELLITUS WITHOUT COMPLICATION, WITHOUT LONG-TERM CURRENT USE OF INSULIN (H): Primary | ICD-10-CM

## 2021-03-17 PROCEDURE — 99606 MTMS BY PHARM EST 15 MIN: CPT | Mod: TEL | Performed by: PHARMACIST

## 2021-03-17 NOTE — PROGRESS NOTES
Medication Therapy Management (MTM) Encounter    ASSESSMENT:                            DM: A1c above goal <7%. Patient making changes as instructed. Area for improvement being exercise. Despite education declined statin at this time.     PLAN:                            1. Continue to work on maintaining the diet changes you have made.   2. We want to work to increase our exercise to 5 days per week, 30 minutes each time. Small increases like 5 minutes per session can get us to our goal!  3. You can think about using a cholesterol lowering medicine to reduce your risk of stroke and heart disease. Let us know if you would like to start this.   4. Continue to check your blood sugar in the morning before eating and 2 hours after a meal. We hope to see these numbers decrease with your diet changes and increasing your exercise.       Follow-up: 4 weeks, review BG results    SUBJECTIVE/OBJECTIVE:                          Jimmie Barrientos is a 45 year old female called for a follow-up visit. She was referred to me from Dr. Angela Ceron.  was present during today's visit. Today's visit is a follow-up MTM visit from 3/1      Documentation:                  Due to patient being non-English speaking/uses sign language, an  was used for this visit. Only for face-to-face interpretation by an external agency, date and length of interpretation can be found on the scanned worksheet.     name: Cecy  Agency: [employer]  Language: Alexander   Telephone number:-[phone]  Type of interpretation: Telephone, spoken                      Reason for visit: diabetes follow up.    Allergies/ADRs: Reviewed in chart  Social History     Tobacco Use     Smoking status: Never Smoker     Smokeless tobacco: Never Used   Substance Use Topics     Alcohol use: No     Alcohol/week: 0.0 standard drinks     Drug use: No     ^Reviewed today  Past Medical History: Reviewed in chart    DM: Increase veggie (broccoli,  "cabbage, greens) and decrease rice. Thinks that BG are improving. 830-9 am checking, fasting value. Today 137, Yesterday 136. No postprandial value. 2 hours after meal: 180, 187. Would like to continue these diet changes to see if they continue to help her blood sugar. Stretching exercises, walking in warmer weather. Machine inside to exercise as well, bicycle. \"Getting old\" and unsure about exercising 5 days per week. 15-20 minutes each time. Using own BG monitoring machine. Introduce idea about statin, at this time pt would like to decline. Would like to instead work on exercise.     Lab Results   Component Value Date    A1C 7.2 02/09/2021    A1C 6.9 06/08/2020    A1C 6.6 09/12/2019    A1C 6.5 06/29/2018    A1C 6.5 11/07/2017     ----------------      I spent 20 minutes with this patient today. Dr. Angela Ceron was provided the recommendations above  via routed note and Dr. Smith/Darin is the authorizing prescriber for this visit through the pharmacist collaborative practice agreement.. A copy of the visit note was provided to the patient's primary care provider.    The patient was mailed a summary of these recommendations.     Jaki Cortez, PharmD, CDCES (previously, CDE)  [facility]  Phone: [phone]  March 17, 2021 at 10:15 AM      Telemedicine Visit Details  Type of service:  Telephone visit  Start Time: 10:08 AM  End Time: 10:28 AM  Originating Location (patient location): Home  Distant Location (provider location):  [facility] MTM      For insurance/tracking purposes, MTM Team Documentation:    Medication Therapy Recommendations  Type 2 diabetes mellitus without complication, without long-term current use of insulin (H)    Rationale: Preventive therapy - Needs additional medication therapy - Indication   Recommendation: Start Medication - history of diabetes indicates statin treatment   Status: Declined per Provider               "

## 2021-03-17 NOTE — PATIENT INSTRUCTIONS
Recommendations from today's visit:                                                      1. Continue to work on maintaining the diet changes you have made.   2. We want to work to increase our exercise to 5 days per week, 30 minutes each time. Small increases like 5 minutes per session can get us to our goal!  3. You can think about using a cholesterol lowering medicine to reduce your risk of stroke and heart disease. Let us know if you would like to start this.   4. Continue to check your blood sugar in the morning before eating and 2 hours after a meal. We hope to see these numbers decrease with your diet changes and increasing your exercise.       It was great to speak with you today!    I value your experience. You may receive a survey via email or text message in the next few days. I would be very thankful for your time in providing feedback.    Pharmacist's contact information:                                                      Please feel free to contact me with any questions or concerns you have.     Jaki Cortez  Pharmacist, Certified Diabetes Care and   [facility]  Phone: [phone]  March 17, 2021 at 4:44 PM

## 2021-04-21 ENCOUNTER — VIRTUAL VISIT (OUTPATIENT)
Dept: PHARMACY | Facility: CLINIC | Age: 46
End: 2021-04-21
Payer: COMMERCIAL

## 2021-04-21 DIAGNOSIS — E11.9 TYPE 2 DIABETES MELLITUS WITHOUT COMPLICATION, WITHOUT LONG-TERM CURRENT USE OF INSULIN (H): Primary | ICD-10-CM

## 2021-04-21 DIAGNOSIS — A04.8 H. PYLORI INFECTION: ICD-10-CM

## 2021-04-21 DIAGNOSIS — E87.6 HYPOKALEMIA: ICD-10-CM

## 2021-04-21 PROCEDURE — 99606 MTMS BY PHARM EST 15 MIN: CPT | Performed by: PHARMACIST

## 2021-04-21 PROCEDURE — 99607 MTMS BY PHARM ADDL 15 MIN: CPT | Performed by: PHARMACIST

## 2021-04-21 NOTE — PATIENT INSTRUCTIONS
Recommendations from today's visit:                                                      1. Great work on increasing exercise and the healthy changes you have made to your eating.   2. We will repeat your diabetes blood work at the appointment in May. This will help us to know how well these changes have helped your blood sugar and if there is anything else we should do going forward.   3. We will also repeat your cholesterol blood test at the appointment in May. With this result, we can talk more about starting a medicine to protect your heart.   4. At your appointment in May we will have you  the kit to collect a sample to test for H pylori        It was great to speak with you today!    I value your experience. You may receive a survey via email or text message in the next few days. I would be very thankful for your time in providing feedback.    Pharmacist's contact information:                                                      Please feel free to contact me with any questions or concerns you have.     Jaki Cortez  Pharmacist, Certified Diabetes Care and   [facility]  Phone: [phone]  April 21, 2021 at 11:04 AM

## 2021-04-21 NOTE — PROGRESS NOTES
"Medication Therapy Management (MTM) Encounter    ASSESSMENT:                            DM: Uncontrolled above goal <7%, however patient has made many positive changes. Upcoming A1c will help provide more data about urgency or lack of urgency of any further change (further lifestyle vs. Medicine)  H pylori: Uncontrolled, need repeat testing to confirm eradication. Looks like patient has completed both triple and quad therapy historically, if returns positive would need to consider use of \"salvage\" therapy.     HypoK/Mg: Last K WNL, uncontrolled Mag, need repeat Mg testing to confirm supplementation is effective. Unclear cause.     PLAN:                            1. Great work on increasing exercise and the healthy changes you have made to your eating.   2. We will repeat your diabetes blood work at the appointment in May. This will help us to know how well these changes have helped your blood sugar and if there is anything else we should do going forward.   3. We will also repeat your cholesterol blood test at the appointment in May. With this result, we can talk more about starting a medicine to protect your heart.   4. At your appointment in May we will have you  the kit to collect a sample to test for H pylori      Follow-up: Return in about 3 weeks (around 5/12/2021) for visit with your primary doctor, Dr. Miller. Please refer back to pharmacist as needed based on lab results.     May follow up: A1c, lipid, microalbumin, magnesium, H pylori, vaccinations    SUBJECTIVE/OBJECTIVE:                          Jimmie Barrientos is a 45 year old female called for a follow-up visit. She was referred to me from Dr. Angela Ceron.  was present during today's visit. Today's visit is a follow-up MTM visit from 3/17/2021.      Documentation:                  Due to patient being non-English speaking/uses sign language, an  was used for this visit. Only for face-to-face interpretation by an " external agency, date and length of interpretation can be found on the scanned worksheet.     name: Robbie  Agency: [employer]  Language: Alexander   Telephone [phone]  Type of interpretation: Telephone, spoken                      Reason for visit: diabetes follow up.    Allergies/ADRs: Reviewed in chart  Social History     Tobacco Use     Smoking status: Never Smoker     Smokeless tobacco: Never Used   Substance Use Topics     Alcohol use: No     Alcohol/week: 0.0 standard drinks     Drug use: No     ^Reviewed today  Past Medical History: Reviewed in chart    DM: Son also been ill for last week so been busy taking care of him. Values from earlier in the month, 129-135, fasting values. Postprandial values 170-180s. Exercises like walking, since our last visit notes is going for longer walks. Continues to work on dietary changes like eating smaller portion, less rice more veggies, less sweets, less fatty meat. Family also making changes with her, children, . No other questions about statin medicine, would like to repeat cholesterol test.     Lab Results   Component Value Date    A1C 7.2 02/09/2021    A1C 6.9 06/08/2020    A1C 6.6 09/12/2019    A1C 6.5 06/29/2018    A1C 6.5 11/07/2017     H pylori: Ulcers in the past. Remembers taking a medicine in the past for this. Agreeable to testing for eradication.         HypoK/Mg: Continues on supplements. No concerns.     Results for CIERRAAMY (MRN [id number]) as of 4/21/2021 11:14   Ref. Range 2/9/2021 09:40 2/9/2021 09:52 2/23/2021 09:06   Potassium Latest Ref Range: 3.5 - 5.0 mmol/L   3.7   Magnesium Latest Ref Range: 1.8 - 2.6 mg/dL 1.7 (L)       ----------------      I spent 19 minutes with this patient today. Dr. Angela Ceron was provided the recommendations above  via routed note and Dr. Webster is the authorizing prescriber for this visit through the pharmacist collaborative practice agreement.. A copy of the visit note was provided to the  patient's primary care provider.    The patient was mailed a summary of these recommendations.     Jaki Cortez, PharmD, CDCES (previously, CDE)  [facility]  Phone: [phone]  April 21, 2021 at 10:34 AM      Telemedicine Visit Details  Type of service:  Telephone visit  Start Time: 10:32 AM  End Time: 10:51 AM  Originating Location (patient location): Home  Distant Location (provider location):  [facility] MTM    For insurance/tracking purposes, MTM Team Documentation:      Medication Therapy Recommendations  H. pylori infection    Rationale: Medication requires monitoring - Needs additional monitoring   Recommendation: Order Lab - to obtain at next visit with primary   Status: Patient Agreed - Adherence/Education         Hypomagnesemia    Current Medication: magnesium oxide (MAG-OX) 400 (241.3 Mg) MG tablet   Rationale: Medication requires monitoring - Needs additional monitoring   Recommendation: Order Lab - lab at next PCP visit   Status: Patient Agreed - Adherence/Education

## 2021-04-21 NOTE — Clinical Note
Hi lady! Lots of labs next time but hopefully helped set you up for success. Please have her schedule back with me as you see fit :)

## 2021-05-10 ENCOUNTER — OFFICE VISIT (OUTPATIENT)
Dept: FAMILY MEDICINE | Facility: CLINIC | Age: 46
End: 2021-05-10
Payer: COMMERCIAL

## 2021-05-10 VITALS
OXYGEN SATURATION: 100 % | HEIGHT: 59 IN | SYSTOLIC BLOOD PRESSURE: 113 MMHG | HEART RATE: 72 BPM | RESPIRATION RATE: 16 BRPM | TEMPERATURE: 98.6 F | BODY MASS INDEX: 30 KG/M2 | DIASTOLIC BLOOD PRESSURE: 78 MMHG | WEIGHT: 148.8 LBS

## 2021-05-10 DIAGNOSIS — A04.8 H. PYLORI INFECTION: ICD-10-CM

## 2021-05-10 DIAGNOSIS — E83.42 HYPOMAGNESEMIA: ICD-10-CM

## 2021-05-10 DIAGNOSIS — E11.9 TYPE 2 DIABETES MELLITUS WITHOUT COMPLICATION, WITHOUT LONG-TERM CURRENT USE OF INSULIN (H): Primary | ICD-10-CM

## 2021-05-10 LAB
BUN SERPL-MCNC: 9 MG/DL (ref 5–24)
CALCIUM SERPL-MCNC: 9 MG/DL (ref 8.5–10.4)
CHLORIDE SERPLBLD-SCNC: 106 MMOL/L (ref 94–109)
CHOLEST SERPL-MCNC: 137 MG/DL
CHOLEST/HDLC SERPL: 2.6 RATIO
CO2 SERPL-SCNC: 24 MMOL/L (ref 20–32)
CREAT SERPL-MCNC: 0.6 MG/DL (ref 0.6–1.3)
CREAT UR-MCNC: 122.1 MG/DL
EGFR CALCULATED (NON BLACK REFERENCE): >90 ML/MIN
GLUCOSE SERPL-MCNC: 125 MG/DL (ref 60–109)
HBA1C MFR BLD: 7.1 % (ref 4.1–5.7)
HDLC SERPL-MCNC: 52 MG/DL
LDLC SERPL CALC-MCNC: 61 MG/DL (ref 0–99)
MAGNESIUM SERPL-MCNC: 1.8 MG/DL (ref 1.8–2.6)
MICROALBUMIN UR-MCNC: 1.77 MG/DL (ref 0–1.99)
MICROALBUMIN/CREAT UR: 14.5 MG/G
POTASSIUM SERPL-SCNC: 3.4 MMOL/L (ref 3.4–5.3)
SODIUM SERPL-SCNC: 139 MMOL/L (ref 133–144)
TRIGL SERPL-MCNC: 116 MG/DL
VLDL-CHOLESTEROL: 23 MG/DL (ref 7–32)

## 2021-05-10 PROCEDURE — 99214 OFFICE O/P EST MOD 30 MIN: CPT | Mod: GC | Performed by: STUDENT IN AN ORGANIZED HEALTH CARE EDUCATION/TRAINING PROGRAM

## 2021-05-10 PROCEDURE — 80048 BASIC METABOLIC PNL TOTAL CA: CPT | Performed by: STUDENT IN AN ORGANIZED HEALTH CARE EDUCATION/TRAINING PROGRAM

## 2021-05-10 PROCEDURE — 36415 COLL VENOUS BLD VENIPUNCTURE: CPT | Performed by: STUDENT IN AN ORGANIZED HEALTH CARE EDUCATION/TRAINING PROGRAM

## 2021-05-10 PROCEDURE — 99207 ZZC FOOT EXAM  NO CHARGE: CPT | Performed by: STUDENT IN AN ORGANIZED HEALTH CARE EDUCATION/TRAINING PROGRAM

## 2021-05-10 PROCEDURE — 83036 HEMOGLOBIN GLYCOSYLATED A1C: CPT | Performed by: STUDENT IN AN ORGANIZED HEALTH CARE EDUCATION/TRAINING PROGRAM

## 2021-05-10 PROCEDURE — 80061 LIPID PANEL: CPT | Performed by: STUDENT IN AN ORGANIZED HEALTH CARE EDUCATION/TRAINING PROGRAM

## 2021-05-10 ASSESSMENT — MIFFLIN-ST. JEOR: SCORE: 1230.58

## 2021-05-10 NOTE — PROGRESS NOTES
HPI:       Jimmie Barrientos is a 45 year old  female  who presents for:      Diabetes check   - normal fasting am 120 if she does check it  - increased exercise: stationary bike 3-4 times a week   - reduced carbs, increase vegetables and fruits      Hypokalemia and hypomagnesemia follow up   - taking magnesium and potassium replacement   - has not been taking magnesium as regularly as she should, said she gets upset stomach at times  - is taking potassium regularly     H. Pylori eradication check   - history of triple and quad therapy.  - needs recheck for eradication    A [employer]  was used for this visit         PMHX:     Patient Active Problem List   Diagnosis     Hepatitis B carrier (H)     Pap smear for cervical cancer screening     Type 2 diabetes mellitus without complication, without long-term current use of insulin (H)     Urinary tract stones     Chronic allergic conjunctivitis     Class 1 obesity with body mass index (BMI) of 31.0 to 31.9 in adult     Hypokalemia     Fatigue, unspecified type     H. pylori infection     Hypomagnesemia       Current Outpatient Medications   Medication Sig Dispense Refill     ASPIRIN NOT PRESCRIBED (INTENTIONAL) Please choose reason not prescribed from choices below.       blood glucose (NO BRAND SPECIFIED) lancets standard Use to test blood sugar 2 times daily 100 each 11     blood glucose (NO BRAND SPECIFIED) test strip Use to test blood sugar 2 times daily 100 strip 11     blood glucose monitoring (NO BRAND SPECIFIED) meter device kit Use to test blood sugar 2 times daily 1 kit 0     magnesium oxide (MAG-OX) 400 (241.3 Mg) MG tablet Take 1 tablet (400 mg) by mouth daily 30 tablet 3     potassium chloride ER (KLOR-CON M) 20 MEQ CR tablet Take 1 tablet (20 mEq) by mouth daily 90 tablet 1     STATIN NOT PRESCRIBED (INTENTIONAL) Please choose reason not prescribed from choices below.            Allergies   Allergen Reactions     No Known Allergies        No results  "found for this or any previous visit (from the past 24 hour(s)).         Review of Systems:   10 point ROS negative except noted in above in HPI         Physical Exam:     Vitals:    05/10/21 1001   BP: 113/78   Pulse: 72   Resp: 16   Temp: 98.6  F (37  C)   TempSrc: Oral   SpO2: 100%   Weight: 67.5 kg (148 lb 12.8 oz)   Height: 1.499 m (4' 11\")     Body mass index is 30.05 kg/m .    GENERAL APPEARANCE: healthy, alert and no distress,  RESP: lungs clear to auscultation - no rales, rhonchi or wheezes  CV: regular rate and rhythm,  and no murmur, click,  rub or gallop  ABDOMEN: soft, nontender, without hepatosplenomegaly or masses  PSYCH: mood and affect normal/bright  FEET: normal monifilament test, no open wounds or sores      Assessment and Plan     1. Type 2 diabetes mellitus without complication, without long-term current use of insulin (H)  Has made lifestyle changes: increased exercise, has been working on her diet.   - Hemoglobin A1c ([facility] )  - Basic Metabolic Panel ([facility]) - Results < 1 hr  - Lipid Panel ([facility]) - Results < 1 hr  - Microalbumin Creatinine Ratio Random Ur ([facility])  - ZC FOOT EXAM  NO CHARGE  - had eye exam in February   - A1c 7.1 today continue lifestyle changes, patient not interested in medication    2. Hypomagnesemia  Taking mag replacement irregularly. Will check today   - Basic Metabolic Panel ([facility]) - Results < 1 hr  - Magnesium ([facility])    3. H. pylori infection  Has completed triple and quad therapy in the past.   - H. Pylori Agn Fecal ([facility]); Future      3 month(s) or as needed if new symptoms arise     Options for treatment and follow-up care were reviewed with the patient and/or guardian. Jimmie Barrientos and/or guardian engaged in the decision making process and verbalized understanding of the options discussed and agreed with the final plan.      Lyndsey Ceron MD   [facility] Resident   Pager: [phone]      Precepted today with: Dr. Knight  "

## 2021-05-10 NOTE — PROGRESS NOTES
Preceptor Attestation:  Patient's case reviewed and discussed with the resident, Lyndsey Ceron MD, and I personally evaluated the patient. I agree with written assessment and plan of care.    Supervising Physician:  Marybel Knight MD   [facility]

## 2021-05-10 NOTE — LETTER
May 14, 2021      Jimmie Barrientos  [address] [place] [address]        Dear ,    We are writing to inform you of your test results.    Hi Jimmie,   Your magnesium is at a normal level. Your cholesterol is within normal limits. Your potassium is at a normal level. Please keep working on diet and exercise for your diabetes     Resulted Orders   Hemoglobin A1c ([facility])   Result Value Ref Range    Hemoglobin A1C 7.1 (H) 4.1 - 5.7 %   Basic Metabolic Panel ([facility]) - Results < 1 hr   Result Value Ref Range    Glucose 125.0 (H) 60.0 - 109.0 mg/dL    Urea Nitrogen 9.0 5.0 - 24.0 mg/dL    Creatinine 0.6 0.6 - 1.3 mg/dL    Sodium 139.0 133.0 - 144.0 mmol/L    Potassium 3.4 3.4 - 5.3 mmol/L    Chloride 106.0 94.0 - 109.0 mmol/L    Carbon Dioxide 24.0 20.0 - 32.0 mmol/L    Calcium 9.0 8.5 - 10.4 mg/dL    eGFR Calculated (Non Black Reference) >90 >60.0 mL/min      Comment:      eGFR is calculated by the CKD-EPI creatinine equation, without race   adjustment. eGFR can be influenced by muscle mass, exercise, and diet. The   reported eGFR is an estimation only and is only applicable if the renal   function is stable.     Lipid Panel ([facility]) - Results < 1 hr   Result Value Ref Range    Cholesterol 137.0 <200.0 mg/dL    Triglycerides 116.0 <150.0 mg/dL    HDL Cholesterol 52.0 >50.0 mg/dL      Comment:      If diabetic or CVD then reference range <100    VLDL-Cholesterol 23.0 7.0 - 32.0 mg/dL    LDL Cholesterol Direct 61.0 0.0 - 99.0 mg/dL    Cholesterol/HDL Ratio 2.6 <5.0 RATIO   Microalbumin Creatinine Ratio Random Ur ([facility])   Result Value Ref Range    Microalbumin, Urine 1.77 0.00 - 1.99 mg/dL    Creatinine, Urine 122.1 mg/dL    Albumin Urine mg/g Cr 14.5 <=19.9 mg/g    Narrative    Test performed by:  [facility]  [address] [place] [address]  Microalbumin, Random Urine  <2.0 mg/dL . . . . . . . . Normal  3.0-30.0 mg/dL . . . . . . Microalbuminuria  >30.0 mg/dL . . . . . .  .  Clinical Proteinuria  Microalbumin/Creatinine Ratio, Random Urine  <20 mg/g . . . . .. . . . Normal   mg/g . . . . . . . Microalbuminuria  >300 mg/g . . . . . . . . Clinical Proteinuria   Magnesium ([facility])   Result Value Ref Range    Magnesium 1.8 1.8 - 2.6 mg/dL    Narrative    Test performed by:  [facility] [address] [place] [address]       If you have any questions or concerns, please call the clinic at the number listed above.       Sincerely,      Marybel Knight MD

## 2021-05-10 NOTE — NURSING NOTE
Due to patient being non-English speaking/uses sign language, an  was used for this visit. Only for face-to-face interpretation by an external agency, date and length of interpretation can be found on the scanned worksheet.     name: George  Agency: [employer]  Language: Alexander   Telephone number: [phone]  Type of interpretation: Face-to-face, spoken

## 2021-05-13 NOTE — RESULT ENCOUNTER NOTE
Please send the following in a letter to the patient:   Hi Jimmie,   Your magnesium is at a normal level. Your cholesterol is within normal limits. Your potassium is at a normal level. Please keep working on diet and exercise for your diabetes  - Lyndsey Ceron MD

## 2021-06-14 NOTE — PROGRESS NOTES
Assessment/Plan:        Diagnoses and all orders for this visit:    Calculus of ureter  -     CT Abdomen Pelvis Without Oral Without IV Contrast; Future; Expected date: 12/26/17  -     Patient Stated Goal: Pass my stone    Hydronephrosis with urinary obstruction due to ureteral calculus    Flank pain  -     tamsulosin (FLOMAX) 0.4 mg Cp24; Take 1 capsule (0.4 mg total) by mouth Daily after breakfast.  Dispense: 14 capsule; Refill: 0    Urinary tract stones  -     Urinalysis Macroscopic    Ureteral stone  -     tamsulosin (FLOMAX) 0.4 mg Cp24; Take 1 capsule (0.4 mg total) by mouth Daily after breakfast.  Dispense: 14 capsule; Refill: 0      Stone Management Plan  [facility] Stone Management 12/12/2017   Urinary Tract Infection No suspicion of infection   Renal Colic Well controlled symptoms   Renal Failure No suspicion of renal failure   Current CT date 12/12/2017   Right sided stones? No   R Stone Event No current event   Left sided stones? Yes   L Number of ureteral stones 1   L GSD of ureteral stones 8   L Location of ureteral stone Distal   L Number of kidney stones  No renal stones   L GSD of kidney stones N/A   L Hydronephrosis Mild   L Stone Event New event   Diagnosis date 12/12/2017   Initial location of primary symptomatic stone Distal   Initial GSD of primary symptomatic stone 8         Subjective:      HPI  Ms. Jimmie Barrientos is a 42 y.o. H[place] female presenting to the [facility] [place] following [place]'s ED visit with newly diagnosed left urolithiasis.    She is a first time unidentified composition stone former. She has identified modifiable stone risks including:  type II diabetes. She has no identified non-modifiable stone risk factors.    Primary symptom at presentation was acute onset left flank pain. The pain has been intermittent with radiation to the left abdomen. It has been severe, reaching 9/10 at its worst. No associated alleviating or aggravating factors. Significant associated  symptoms at presentation included: dysuria. Evaluation in ED was notable for an obstructing left distal ureteral stone. She was sent home with prescriptions for ciprofloxacin, percocet, and flomax. She has no prior history of stone disease or urinary tract infections.    She is asymptomatic at present. She denies symptoms of fever, chills, flank pain, nausea, vomiting, urinary frequency and dysuria.     CT scan is personally reviewed and demonstrates a mildly obstructing 8 mm left UVJ calculus. No additional urinary tract calculi.    Significant labs from presentation include mild hematuria, mild pyuria, negative nitrite, few bacteria and pending results on urine culture.    PLAN    41 yo H[place] F with newly diagnosed obstructing left urolithiasis, initially presenting with left flank pain and dysuria. Current mild suprapubic pain.    Discussed options for management including trial of passage versus surgical intervention. Jimmie would like to avoid surgery and try to pass her stone.    Will follow pending urine culture but suspicion for active infection low.    Will proceed with medical expulsive therapy. Risks and benefits were detailed of medical expulsive therapy including probability of stone passage, recurrent renal colic, and requirement of emergency medical and/or surgical care and further imaging. Patient verbalized understanding. Patient agrees with plan as discussed. She will return in 2 weeks with low dose CT scan.    For symptom control, she was prescribed Percocet and Flomax. She will continue current ciprofloxacin, pending culture results. Over the counter symptom control medications of ibuprofen and Dramamine were recommended.    Patient also seen and examined by NATHAN Fountain Review of Systems  A 12 point comprehensive review of systems is negative except for HPI    Past Medical History:   Diagnosis Date     Back pain      History of stomach ulcers      No past surgical history on  file.    Current Outpatient Prescriptions   Medication Sig Dispense Refill     ciprofloxacin HCl (CIPRO) 500 MG tablet Take 1 tablet (500 mg total) by mouth 2 (two) times a day for 7 days. 14 tablet 0     omeprazole (PRILOSEC) 10 MG capsule Take 10 mg by mouth daily.       oxyCODONE-acetaminophen (PERCOCET) 5-325 mg per tablet Take 1-2 tablets by mouth every 6 (six) hours as needed for pain. 10 tablet 0     tamsulosin (FLOMAX) 0.4 mg Cp24 Take 1 capsule (0.4 mg total) by mouth daily. 5 capsule 0     No current facility-administered medications for this visit.      No Known Allergies    Social History     Social History     Marital status:      Spouse name: N/A     Number of children: N/A     Years of education: N/A     Occupational History     Not on file.     Social History Main Topics     Smoking status: Never Smoker     Smokeless tobacco: Not on file     Alcohol use Not on file     Drug use: Not on file     Sexual activity: Not on file     Other Topics Concern     Not on file     Social History Narrative     No family history on file.    Objective:      Physical Exam  Vitals:    12/12/17 1316   BP: 121/74   Pulse: 85   Temp: 98  F (36.7  C)     General - well developed, well nourished, appropriate for age. Appears no distress at this time   Heart - regular rate and rhythm, no murmur  Respiratory - normal effort, clear to auscultation, good air entry without adventitious noises  Abdomen - mildly obese soft, non-tender, no hepatosplenomegaly, no masses.   - no flank tenderness, no suprapubic tenderness, kidney and bladder non-palpable  MSK - normal spinal curvature. no spinal tenderness. normal gait. muscular strength intact.  Neurology - cranial nerves II-XII grossly intact, normal sensation, no unsteadiness  Skin - intact, no bruising, no gouty tophi  Psych - oriented to time, place, and person, normal mood and affect.    Labs  Urinalysis POC (Office):  Nitrite, UA   Date Value Ref Range Status    12/12/2017 Negative Negative Final   11/05/2016 Negative Negative Final       Lab Urinalysis:  Blood, UA   Date Value Ref Range Status   12/12/2017 Small (!) Negative Final   11/05/2016 Negative Negative Final     Nitrite, UA   Date Value Ref Range Status   12/12/2017 Negative Negative Final   11/05/2016 Negative Negative Final     Leukocytes, UA   Date Value Ref Range Status   12/12/2017 Small (!) Negative Final   11/05/2016 Negative Negative Final     pH, UA   Date Value Ref Range Status   12/12/2017 5.5 4.5 - 8.0 Final   11/05/2016 6.0 4.5 - 8.0 Final    and Acute Labs   CBC   WBC   Date Value Ref Range Status   11/05/2016 7.8 4.0 - 11.0 thou/uL Final   11/03/2016 7.7 4.0 - 11.0 thou/uL Final     Hemoglobin   Date Value Ref Range Status   11/05/2016 14.2 12.0 - 16.0 g/dL Final   11/03/2016 13.7 12.0 - 16.0 g/dL Final   08/07/2012 9.2 (L) 12.0 - 16.0 g/dL Final     Platelets   Date Value Ref Range Status   11/05/2016 158 140 - 440 thou/uL Final   11/03/2016 161 140 - 440 thou/uL Final   , Renal Panel  [facility]  Creatinine   Date Value Ref Range Status   11/28/2017 0.67 0.60 - 1.10 mg/dL Final   11/05/2016 0.72 0.60 - 1.10 mg/dL Final   10/25/2016 0.73 0.60 - 1.10 mg/dL Final     Potassium   Date Value Ref Range Status   11/28/2017 4.0 3.5 - 5.0 mmol/L Final   11/05/2016 3.1 (L) 3.5 - 5.0 mmol/L Final   10/25/2016 3.4 (L) 3.5 - 5.0 mmol/L Final     Calcium   Date Value Ref Range Status   11/28/2017 9.0 8.5 - 10.5 mg/dL Final   11/05/2016 9.2 8.5 - 10.5 mg/dL Final   10/25/2016 8.9 8.5 - 10.5 mg/dL Final    and Urine Culture    Culture   Date Value Ref Range Status   11/01/2016 Usual Ilda  Final

## 2021-06-20 NOTE — LETTER
Letter by Cristiana Walters RN at      Author: Cristiana Walters RN Service: -- Author Type: --    Filed:  Encounter Date: 6/12/2020 Status: (Other)       6/12/2020        Jimmie Arteaga[address] [place] [address]    This letter provides a written record that you were tested for COVID-19 on 6/10/2020.     Your result was negative.    This means that we didnt find the virus that causes COVID-19 in your sample. A test may show negative when you do actually have the virus. This can happen when the virus is in the early stages of infection, before you feel illness symptoms.    Even if you dont have symptoms, they may still appear. For safety, its very important to follow these rules.    Keep yourself away from others (self-isolation):      Stay home. Dont go to work, school or anywhere else.     Stay in your own room (and use your own bathroom), if you can.    Stay away from others in your home. No hugging, kissing or shaking hands. No visitors.    Clean high touch surfaces often (doorknobs, counters, handles, etc.). Use a household cleaning spray or wipes.    Cover your mouth and nose with a mask, tissue or washcloth to avoid spreading germs.    Wash your hands and face often with soap and water.    Stay in self-isolation until you meet ALL of the guidelines below:    1. You have had no fever for at least 72 hours (that is 3 full days of no fever without the use of medicine that reduces fevers), AND  2. other symptoms (such as cough, shortness of breath) have gotten better, AND  3. at least 10 days have passed since your symptoms first appeared.    Going back to work  Check with your employer for any guidelines to follow for going back to work.    Employers: This document serves as formal notice that your employee tested negative for COVID-19, as of the testing date shown above.    For questions regarding this letter or your Negative COVID-19 result, call [phone] between 8A to 6:30P (M-F) and 10A to 6:30P  (weekends).

## 2021-06-22 DIAGNOSIS — E87.6 HYPOKALEMIA: ICD-10-CM

## 2021-06-22 NOTE — TELEPHONE ENCOUNTER
Message to physician:     Date of last visit: 5/10/2021     Date of next visit if scheduled: none    Last Comprehensive Metabolic Panel:  Sodium   Date Value Ref Range Status   05/10/2021 139.0 133.0 - 144.0 mmol/L Final     Potassium   Date Value Ref Range Status   05/10/2021 3.4 3.4 - 5.3 mmol/L Final     Chloride   Date Value Ref Range Status   05/10/2021 106.0 94.0 - 109.0 mmol/L Final     Carbon Dioxide   Date Value Ref Range Status   05/10/2021 24.0 20.0 - 32.0 mmol/L Final     Glucose   Date Value Ref Range Status   05/10/2021 125.0 (H) 60.0 - 109.0 mg/dL Final     Urea Nitrogen   Date Value Ref Range Status   05/10/2021 9.0 5.0 - 24.0 mg/dL Final     Creatinine   Date Value Ref Range Status   05/10/2021 0.6 0.6 - 1.3 mg/dL Final     GFR Estimate   Date Value Ref Range Status   06/08/2020 >60 >60 mL/min/1.73m2 Final     Calcium   Date Value Ref Range Status   05/10/2021 9.0 8.5 - 10.4 mg/dL Final       BP Readings from Last 3 Encounters:   05/10/21 113/78   03/01/21 105/69   02/23/21 99/68       Lab Results   Component Value Date    A1C 7.1 05/10/2021    A1C 7.2 02/09/2021    A1C 6.9 06/08/2020    A1C 6.6 09/12/2019    A1C 6.5 06/29/2018                Please complete refill and CLOSE ENCOUNTER.  Closing the encounter signifies the refill is complete.

## 2021-06-28 RX ORDER — POTASSIUM CHLORIDE 1500 MG/1
20 TABLET, EXTENDED RELEASE ORAL DAILY
Qty: 90 TABLET | Refills: 0 | Status: SHIPPED | OUTPATIENT
Start: 2021-06-28 | End: 2021-09-24

## 2021-09-24 DIAGNOSIS — E87.6 HYPOKALEMIA: ICD-10-CM

## 2021-09-28 RX ORDER — POTASSIUM CHLORIDE 1500 MG/1
20 TABLET, EXTENDED RELEASE ORAL DAILY
Qty: 90 TABLET | Refills: 0 | Status: SHIPPED | OUTPATIENT
Start: 2021-09-28 | End: 2021-10-14

## 2021-10-14 ENCOUNTER — OFFICE VISIT (OUTPATIENT)
Dept: FAMILY MEDICINE | Facility: CLINIC | Age: 46
End: 2021-10-14
Payer: COMMERCIAL

## 2021-10-14 VITALS
DIASTOLIC BLOOD PRESSURE: 77 MMHG | WEIGHT: 152 LBS | OXYGEN SATURATION: 99 % | HEIGHT: 59 IN | RESPIRATION RATE: 20 BRPM | BODY MASS INDEX: 30.64 KG/M2 | HEART RATE: 70 BPM | TEMPERATURE: 98 F | SYSTOLIC BLOOD PRESSURE: 120 MMHG

## 2021-10-14 DIAGNOSIS — B18.1 HEPATITIS B CARRIER (H): ICD-10-CM

## 2021-10-14 DIAGNOSIS — Z00.00 HEALTHCARE MAINTENANCE: ICD-10-CM

## 2021-10-14 DIAGNOSIS — E11.9 TYPE 2 DIABETES MELLITUS WITHOUT COMPLICATION, WITHOUT LONG-TERM CURRENT USE OF INSULIN (H): ICD-10-CM

## 2021-10-14 DIAGNOSIS — E87.6 HYPOKALEMIA: Primary | ICD-10-CM

## 2021-10-14 LAB
ANION GAP SERPL CALCULATED.3IONS-SCNC: 10 MMOL/L (ref 5–18)
BUN SERPL-MCNC: 10 MG/DL (ref 8–22)
CALCIUM SERPL-MCNC: 9 MG/DL (ref 8.5–10.5)
CHLORIDE BLD-SCNC: 107 MMOL/L (ref 98–107)
CO2 SERPL-SCNC: 21 MMOL/L (ref 22–31)
CREAT SERPL-MCNC: 0.6 MG/DL (ref 0.6–1.1)
GFR SERPL CREATININE-BSD FRML MDRD: >90 ML/MIN/1.73M2
GLUCOSE BLD-MCNC: 127 MG/DL (ref 70–125)
HBA1C MFR BLD: 7.1 % (ref 0–5.6)
MAGNESIUM SERPL-MCNC: 1.9 MG/DL (ref 1.8–2.6)
POTASSIUM BLD-SCNC: 3.5 MMOL/L (ref 3.5–5)
SODIUM SERPL-SCNC: 138 MMOL/L (ref 136–145)

## 2021-10-14 PROCEDURE — 83735 ASSAY OF MAGNESIUM: CPT | Performed by: STUDENT IN AN ORGANIZED HEALTH CARE EDUCATION/TRAINING PROGRAM

## 2021-10-14 PROCEDURE — 36415 COLL VENOUS BLD VENIPUNCTURE: CPT | Performed by: STUDENT IN AN ORGANIZED HEALTH CARE EDUCATION/TRAINING PROGRAM

## 2021-10-14 PROCEDURE — 80048 BASIC METABOLIC PNL TOTAL CA: CPT | Performed by: STUDENT IN AN ORGANIZED HEALTH CARE EDUCATION/TRAINING PROGRAM

## 2021-10-14 PROCEDURE — 83036 HEMOGLOBIN GLYCOSYLATED A1C: CPT | Performed by: STUDENT IN AN ORGANIZED HEALTH CARE EDUCATION/TRAINING PROGRAM

## 2021-10-14 PROCEDURE — 99214 OFFICE O/P EST MOD 30 MIN: CPT | Mod: 25 | Performed by: STUDENT IN AN ORGANIZED HEALTH CARE EDUCATION/TRAINING PROGRAM

## 2021-10-14 RX ORDER — POTASSIUM CHLORIDE 1500 MG/1
20 TABLET, EXTENDED RELEASE ORAL DAILY
Qty: 90 TABLET | Refills: 0 | Status: SHIPPED | OUTPATIENT
Start: 2021-10-14 | End: 2023-01-05

## 2021-10-14 ASSESSMENT — MIFFLIN-ST. JEOR: SCORE: 1234.71

## 2021-10-14 NOTE — PROGRESS NOTES
Preceptor Attestation:   Patient seen, evaluated and discussed with the resident. I have verified the content of the note, which accurately reflects my assessment of the patient and the plan of care.       Supervising Physician:Mihaela Law MD    [facility]

## 2021-10-14 NOTE — PROGRESS NOTES
HPI:       Jimmie Barrientos is a 46 year old  female with PMH significant for hypokalemia, type 2 diabetes mellitus, hepatitis B who presents for:      1. Potassium check  - Has been taking potassium pills regularly  - No symptoms today.   - Has had workup in 2019, which included aldosterone, 24h urine potassium, urine creatinine, CK, Thyroid.   - Pt not taking and diuretic medications that would cause this.       2. Health maintenance  - Notes that she had a mammogram about 6-7 years ago, no family history of this. Declines testing today.   -Hepatitis B - liver labs checked this year, no pain or discomfort.   -Will check A1c today.   -    A [employer]  was used for this visit         PMHX:     Patient Active Problem List   Diagnosis     Hepatitis B carrier (H)     Pap smear for cervical cancer screening     Type 2 diabetes mellitus without complication, without long-term current use of insulin (H)     Urinary tract stones     Chronic allergic conjunctivitis     Class 1 obesity with body mass index (BMI) of 31.0 to 31.9 in adult     Hypokalemia     Fatigue, unspecified type     H. pylori infection     Hypomagnesemia       Current Outpatient Medications   Medication Sig Dispense Refill     blood glucose (NO BRAND SPECIFIED) lancets standard Use to test blood sugar 2 times daily 100 each 11     blood glucose (NO BRAND SPECIFIED) test strip Use to test blood sugar 2 times daily 100 strip 11     blood glucose monitoring (NO BRAND SPECIFIED) meter device kit Use to test blood sugar 2 times daily 1 kit 0     potassium chloride ER (KLOR-CON M) 20 MEQ CR tablet Take 1 tablet (20 mEq) by mouth daily 90 tablet 0     ASPIRIN NOT PRESCRIBED (INTENTIONAL) Please choose reason not prescribed from choices below. (Patient not taking: Reported on 10/14/2021)       magnesium oxide (MAG-OX) 400 (241.3 Mg) MG tablet Take 1 tablet (400 mg) by mouth daily (Patient not taking: Reported on 10/14/2021) 30 tablet 3     STATIN NOT  "PRESCRIBED (INTENTIONAL) Please choose reason not prescribed from choices below. (Patient not taking: Reported on 10/14/2021)         Social History: Reviewed    Family History: Reviewed       Allergies   Allergen Reactions     No Known Allergies        Results for orders placed or performed in visit on 10/14/21 (from the past 24 hour(s))   Hemoglobin A1c   Result Value Ref Range    Hemoglobin A1C 7.1 (H) 0.0 - 5.6 %            Review of Systems:   10 point ROS negative except noted in above in HPI         Physical Exam:     Vitals:    10/14/21 1025   BP: 120/77   BP Location: Right arm   Patient Position: Sitting   Cuff Size: Adult Regular   Pulse: 70   Resp: 20   Temp: 98  F (36.7  C)   TempSrc: Oral   SpO2: 99%   Weight: 68.9 kg (152 lb)   Height: 1.49 m (4' 10.66\")     Body mass index is 31.06 kg/m .    GENERAL APPEARANCE: healthy, alert and no distress,  RESP: lungs clear to auscultation - no rales, rhonchi or wheezes  CV: regular rate and rhythm,  and no murmur, click,  rub or gallop  MS: extremities normal- no gross deformities noted  SKIN: no suspicious lesions or rashes  PSYCH: mood and affect normal/bright      Assessment and Plan     (E87.6) Hypokalemia  (primary encounter diagnosis)  Comment: Workup in the past has not elicited a cause of the hypokalemia. Workup included 24h urine potassium (low at 11mmol/24hr), urine creatinine, aldosterone level, CK. Thus far no cause has been determined. Pt has not been acidotic. Consider GI losses or profuse sweating as cause, though no diarrhea or vomiting.  Plan: Basic metabolic panel, Magnesium, potassium         chloride ER (KLOR-CON M) 20 MEQ CR tablet    (E11.9) Type 2 diabetes mellitus without complication, without long-term current use of insulin (H)  Comment: Has been controlling her diabetes with diet for now.   Plan: Hemoglobin A1c today    (B18.1) Hepatitis B carrier (H)  Comment: No symptoms, no concerns.   Plan: Had hepatic function panel earlier this " year, which was normal.      -Could consider hepatic function testing sometime next year.     (Z00.00) Healthcare maintenance  Comment: Discussed that she is due for mammogram, wanted to hold off for now. Pt is fully COVID vaccinated, will bring in card to next visit.  Plan: Schedule dedicated healthcare maintenance visit at her convenience.         Options for treatment and follow-up care were reviewed with the patient and/or guardian. Jimmie Barrientos and/or guardian engaged in the decision making process and verbalized understanding of the options discussed and agreed with the final plan.      Garry Scruggs DO [facility] Resident, PGY-2  Pager: [phone]    Precepted today with: Dr. Mihaela Law MD

## 2021-12-13 ENCOUNTER — OFFICE VISIT (OUTPATIENT)
Dept: FAMILY MEDICINE | Facility: CLINIC | Age: 46
End: 2021-12-13
Payer: COMMERCIAL

## 2021-12-13 VITALS
BODY MASS INDEX: 31.04 KG/M2 | OXYGEN SATURATION: 99 % | HEIGHT: 59 IN | HEART RATE: 84 BPM | WEIGHT: 154 LBS | RESPIRATION RATE: 16 BRPM | DIASTOLIC BLOOD PRESSURE: 74 MMHG | SYSTOLIC BLOOD PRESSURE: 117 MMHG

## 2021-12-13 DIAGNOSIS — H93.11 TINNITUS OF RIGHT EAR: Primary | ICD-10-CM

## 2021-12-13 PROCEDURE — 99213 OFFICE O/P EST LOW 20 MIN: CPT | Mod: GC | Performed by: STUDENT IN AN ORGANIZED HEALTH CARE EDUCATION/TRAINING PROGRAM

## 2021-12-13 RX ORDER — NEOMYCIN SULFATE, POLYMYXIN B SULFATE AND DEXAMETHASONE 3.5; 10000; 1 MG/ML; [USP'U]/ML; MG/ML
SUSPENSION/ DROPS OPHTHALMIC
COMMUNITY
Start: 2021-01-14 | End: 2022-12-23

## 2021-12-13 RX ORDER — LANCETS 30 GAUGE
EACH MISCELLANEOUS
COMMUNITY
Start: 2021-03-01

## 2021-12-13 ASSESSMENT — MIFFLIN-ST. JEOR: SCORE: 1243.78

## 2021-12-13 NOTE — PROGRESS NOTES
Assessment and Plan   (H93.11) Tinnitus of right ear  (primary encounter diagnosis)  Comment: No evidence of acutely dangerous cause for her tinnitus in the absence of other symptoms or other exam findings.  Patient does have a history of some facial muscle twitching and asymmetric facial tone due to a medical condition when she was a child.  Possible that these muscular disorders could be causing her tinnitus.  Will refer to PT to see if they can help with this.  Plan: Physical Therapy Referral          Follow up in 3 Month(s).    Options for treatment and follow-up care were reviewed with the patient and/or guardian. Jimmie Barrientos and/or guardian engaged in the decision making process and verbalized understanding of the options discussed and agreed with the final plan.    Garett Garza MD  [facility], PGY-3    Precepted patient with Dr. Eliza Anderson       HPI:   Jimmie Barrientos is a 46 year old female who presents to clinic today for   Chief Complaint   Patient presents with     Ear Problem     ringing in ears for years     Ringing in R ear for 2 weeks, staying about the same during that time.  No other symptoms.  No dizziness/lightheadedness, fevers/chills, double vision/blurry vision.  Is hearing normally.  Nothing else at all going on out of the ordinary.      A [employer]  was used for this visit         Review of Systems:     10 point ROS negative except as noted in HPI.         PMHX:   Active Problems List  Patient Active Problem List   Diagnosis     Hepatitis B carrier (H)     Pap smear for cervical cancer screening     Type 2 diabetes mellitus without complication, without long-term current use of insulin (H)     Urinary tract stones     Chronic allergic conjunctivitis     Class 1 obesity with body mass index (BMI) of 31.0 to 31.9 in adult     Hypokalemia     Fatigue, unspecified type     H. pylori infection     Hypomagnesemia      Active problem list reviewed and updated.    Current Medications  Current Outpatient Medications   Medication Sig Dispense Refill     ASPIRIN NOT PRESCRIBED (INTENTIONAL) Please choose reason not prescribed from choices below. (Patient not taking: Reported on 10/14/2021)       blood glucose (NO BRAND SPECIFIED) lancets standard Use to test blood sugar 2 times daily 100 each 11     blood glucose (NO BRAND SPECIFIED) test strip Use to test blood sugar 2 times daily 100 strip 11     blood glucose monitoring (NO BRAND SPECIFIED) meter device kit Use to test blood sugar 2 times daily 1 kit 0     EYE ITCH RELIEF 0.025 % ophthalmic solution        Global Inject Ease Lancets 30G MISC        neomycin-polymyxin-dexamethasone (MAXITROL) 3.5-97550-5.1 SUSP ophthalmic susp        potassium chloride ER (KLOR-CON M) 20 MEQ CR tablet Take 1 tablet (20 mEq) by mouth daily 90 tablet 0     STATIN NOT PRESCRIBED (INTENTIONAL) Please choose reason not prescribed from choices below. (Patient not taking: Reported on 10/14/2021)       Medication list reviewed and updated.    Social History  Social History     Tobacco Use     Smoking status: Never Smoker     Smokeless tobacco: Never Used   Substance Use Topics     Alcohol use: No     Alcohol/week: 0.0 standard drinks     Drug use: No     History   Drug Use No       Family History  Family History   Problem Relation Age of Onset     Family History Negative Other      Diabetes Mother      Diabetes Brother      Coronary Artery Disease No family hx of      Breast Cancer No family hx of      Colon Cancer No family hx of      Prostate Cancer No family hx of      Other Cancer No family hx of      Urolithiasis Brother      Clotting Disorder No family hx of      Gout No family hx of      Cancer No family hx of      Heart Disease No family hx of        Allergies  Allergies   Allergen Reactions     No Known Allergies             Physical Exam:     Vitals:    12/13/21 1347   BP: 117/74   Pulse: 84  "  Resp: 16   SpO2: 99%   Weight: 69.9 kg (154 lb)   Height: 1.49 m (4' 10.66\")     Body mass index is 31.46 kg/m .    GENERAL APPEARANCE: alert, appears stated age, no acute distress  HEENT: Eyes grossly normal to inspection, nares normal, MMM.  Canals clear, tympanic membranes benign bilaterally with no evidence of effusion, bulging, or inflammation.  Tiny amount of cerumen bilaterally.  RESP: lungs clear to auscultation - no rales, rhonchi, or wheezes, no increased respiratory effort  CV: regular rate and rhythm, no murmurs  ABDOMEN: nondistended  MSK: extremities normal, no gross deformities noted, no lower extremity edema  SKIN: no suspicious lesions or rashes   NEURO: Normal strength and tone, sensory exam grossly normal, mentation appears intact and speech normal, CN 2-12 intact.  Slight R facial droop since she was a child.  PSYCH: mood and affect appropriate  "

## 2021-12-13 NOTE — PROGRESS NOTES
Preceptor Attestation:  Patient's case reviewed and discussed with Ramin Garza MD resident and I evaluated the patient. I agree with written assessment and plan of care.  Supervising Physician:  RUDY AMAYA MD  [facility]

## 2022-05-09 NOTE — NURSING NOTE
"Chief Complaint   Patient presents with     Throat Problem     sore, but also has runny nose and nasal congestion for about 1 week now.      Medication Reconciliation     completed but needs attention.        /71  Pulse 68  Temp 98.2  F (36.8  C) (Oral)  Ht 4' 10.86\" (149.5 cm)  Wt 148 lb (67.1 kg)  LMP 05/29/2018  SpO2 99%  BMI 30.04 kg/m2\  " 5-Fu Counseling: 5-Fluorouracil Counseling:  I discussed with the patient the risks of 5-fluorouracil including but not limited to erythema, scaling, itching, weeping, crusting, and pain.

## 2022-07-18 ENCOUNTER — HOSPITAL ENCOUNTER (EMERGENCY)
Facility: HOSPITAL | Age: 47
Discharge: HOME | End: 2022-07-18
Attending: EMERGENCY MEDICINE | Admitting: EMERGENCY MEDICINE
Payer: COMMERCIAL

## 2022-07-18 ENCOUNTER — APPOINTMENT (OUTPATIENT)
Dept: CT IMAGING | Facility: HOSPITAL | Age: 47
End: 2022-07-18
Attending: EMERGENCY MEDICINE
Payer: COMMERCIAL

## 2022-07-18 VITALS
DIASTOLIC BLOOD PRESSURE: 74 MMHG | RESPIRATION RATE: 20 BRPM | HEART RATE: 72 BPM | HEIGHT: 61 IN | OXYGEN SATURATION: 98 % | SYSTOLIC BLOOD PRESSURE: 135 MMHG | WEIGHT: 152 LBS | TEMPERATURE: 98 F | BODY MASS INDEX: 28.7 KG/M2

## 2022-07-18 DIAGNOSIS — N20.1 URETEROLITHIASIS: ICD-10-CM

## 2022-07-18 LAB
ALBUMIN SERPL-MCNC: 3.8 G/DL (ref 3.5–5)
ALBUMIN UR-MCNC: 50 MG/DL
ALP SERPL-CCNC: 72 U/L (ref 45–120)
ALT SERPL W P-5'-P-CCNC: 18 U/L (ref 0–45)
ANION GAP SERPL CALCULATED.3IONS-SCNC: 13 MMOL/L (ref 5–18)
APPEARANCE UR: ABNORMAL
AST SERPL W P-5'-P-CCNC: 18 U/L (ref 0–40)
BACTERIA #/AREA URNS HPF: ABNORMAL /HPF
BASOPHILS # BLD AUTO: 0.1 10E3/UL (ref 0–0.2)
BASOPHILS NFR BLD AUTO: 1 %
BILIRUB DIRECT SERPL-MCNC: 0.2 MG/DL
BILIRUB SERPL-MCNC: 0.6 MG/DL (ref 0–1)
BILIRUB UR QL STRIP: NEGATIVE
BUN SERPL-MCNC: 14 MG/DL (ref 8–22)
C REACTIVE PROTEIN LHE: 0.3 MG/DL (ref 0–?)
CALCIUM SERPL-MCNC: 8.7 MG/DL (ref 8.5–10.5)
CHLORIDE BLD-SCNC: 105 MMOL/L (ref 98–107)
CO2 SERPL-SCNC: 17 MMOL/L (ref 22–31)
COLOR UR AUTO: YELLOW
CREAT SERPL-MCNC: 0.78 MG/DL (ref 0.6–1.1)
EOSINOPHIL # BLD AUTO: 0 10E3/UL (ref 0–0.7)
EOSINOPHIL NFR BLD AUTO: 0 %
ERYTHROCYTE [DISTWIDTH] IN BLOOD BY AUTOMATED COUNT: 16.5 % (ref 10–15)
GFR SERPL CREATININE-BSD FRML MDRD: >90 ML/MIN/1.73M2
GLUCOSE BLD-MCNC: 207 MG/DL (ref 70–125)
GLUCOSE UR STRIP-MCNC: 30 MG/DL
HCG UR QL: NEGATIVE
HCT VFR BLD AUTO: 36.6 % (ref 35–47)
HGB BLD-MCNC: 11.1 G/DL (ref 11.7–15.7)
HGB UR QL STRIP: ABNORMAL
HOLD SPECIMEN: NORMAL
IMM GRANULOCYTES # BLD: 0.1 10E3/UL
IMM GRANULOCYTES NFR BLD: 1 %
KETONES UR STRIP-MCNC: 40 MG/DL
LEUKOCYTE ESTERASE UR QL STRIP: NEGATIVE
LIPASE SERPL-CCNC: 28 U/L (ref 0–52)
LYMPHOCYTES # BLD AUTO: 1.5 10E3/UL (ref 0.8–5.3)
LYMPHOCYTES NFR BLD AUTO: 18 %
MCH RBC QN AUTO: 22.8 PG (ref 26.5–33)
MCHC RBC AUTO-ENTMCNC: 30.3 G/DL (ref 31.5–36.5)
MCV RBC AUTO: 75 FL (ref 78–100)
MONOCYTES # BLD AUTO: 0.6 10E3/UL (ref 0–1.3)
MONOCYTES NFR BLD AUTO: 8 %
MUCOUS THREADS #/AREA URNS LPF: PRESENT /LPF
NEUTROPHILS # BLD AUTO: 6.1 10E3/UL (ref 1.6–8.3)
NEUTROPHILS NFR BLD AUTO: 72 %
NITRATE UR QL: NEGATIVE
NRBC # BLD AUTO: 0 10E3/UL
NRBC BLD AUTO-RTO: 0 /100
PH UR STRIP: 5.5 [PH] (ref 5–7)
PLATELET # BLD AUTO: 206 10E3/UL (ref 150–450)
POTASSIUM BLD-SCNC: 3.6 MMOL/L (ref 3.5–5)
PROT SERPL-MCNC: 7.8 G/DL (ref 6–8)
RBC # BLD AUTO: 4.87 10E6/UL (ref 3.8–5.2)
RBC URINE: >182 /HPF
SODIUM SERPL-SCNC: 135 MMOL/L (ref 136–145)
SP GR UR STRIP: 1.03 (ref 1–1.03)
SQUAMOUS EPITHELIAL: 6 /HPF
UROBILINOGEN UR STRIP-MCNC: <2 MG/DL
WBC # BLD AUTO: 8.3 10E3/UL (ref 4–11)
WBC URINE: 5 /HPF

## 2022-07-18 PROCEDURE — 81001 URINALYSIS AUTO W/SCOPE: CPT | Performed by: STUDENT IN AN ORGANIZED HEALTH CARE EDUCATION/TRAINING PROGRAM

## 2022-07-18 PROCEDURE — 74176 CT ABD & PELVIS W/O CONTRAST: CPT

## 2022-07-18 PROCEDURE — 96376 TX/PRO/DX INJ SAME DRUG ADON: CPT

## 2022-07-18 PROCEDURE — 82310 ASSAY OF CALCIUM: CPT | Performed by: EMERGENCY MEDICINE

## 2022-07-18 PROCEDURE — 93005 ELECTROCARDIOGRAM TRACING: CPT | Performed by: EMERGENCY MEDICINE

## 2022-07-18 PROCEDURE — 96374 THER/PROPH/DIAG INJ IV PUSH: CPT

## 2022-07-18 PROCEDURE — 81001 URINALYSIS AUTO W/SCOPE: CPT | Performed by: EMERGENCY MEDICINE

## 2022-07-18 PROCEDURE — 81025 URINE PREGNANCY TEST: CPT | Performed by: EMERGENCY MEDICINE

## 2022-07-18 PROCEDURE — 99285 EMERGENCY DEPT VISIT HI MDM: CPT | Mod: 25

## 2022-07-18 PROCEDURE — 93005 ELECTROCARDIOGRAM TRACING: CPT | Performed by: STUDENT IN AN ORGANIZED HEALTH CARE EDUCATION/TRAINING PROGRAM

## 2022-07-18 PROCEDURE — 36415 COLL VENOUS BLD VENIPUNCTURE: CPT | Performed by: STUDENT IN AN ORGANIZED HEALTH CARE EDUCATION/TRAINING PROGRAM

## 2022-07-18 PROCEDURE — 85025 COMPLETE CBC W/AUTO DIFF WBC: CPT | Performed by: EMERGENCY MEDICINE

## 2022-07-18 PROCEDURE — 96375 TX/PRO/DX INJ NEW DRUG ADDON: CPT

## 2022-07-18 PROCEDURE — 86140 C-REACTIVE PROTEIN: CPT | Performed by: EMERGENCY MEDICINE

## 2022-07-18 PROCEDURE — 82248 BILIRUBIN DIRECT: CPT | Performed by: EMERGENCY MEDICINE

## 2022-07-18 PROCEDURE — 81025 URINE PREGNANCY TEST: CPT | Performed by: STUDENT IN AN ORGANIZED HEALTH CARE EDUCATION/TRAINING PROGRAM

## 2022-07-18 PROCEDURE — 250N000011 HC RX IP 250 OP 636: Performed by: EMERGENCY MEDICINE

## 2022-07-18 PROCEDURE — 83690 ASSAY OF LIPASE: CPT | Performed by: EMERGENCY MEDICINE

## 2022-07-18 RX ORDER — DIMENHYDRINATE 50 MG
50 TABLET ORAL EVERY 6 HOURS PRN
Qty: 28 TABLET | Refills: 0 | Status: SHIPPED | OUTPATIENT
Start: 2022-07-18 | End: 2022-07-25

## 2022-07-18 RX ORDER — ONDANSETRON 2 MG/ML
4 INJECTION INTRAMUSCULAR; INTRAVENOUS ONCE
Status: COMPLETED | OUTPATIENT
Start: 2022-07-18 | End: 2022-07-18

## 2022-07-18 RX ORDER — OXYCODONE HYDROCHLORIDE 5 MG/1
5 TABLET ORAL EVERY 4 HOURS PRN
Qty: 12 TABLET | Refills: 0 | Status: SHIPPED | OUTPATIENT
Start: 2022-07-18 | End: 2022-07-22

## 2022-07-18 RX ORDER — IBUPROFEN 200 MG
400 TABLET ORAL EVERY 6 HOURS
Qty: 56 TABLET | Refills: 0 | Status: SHIPPED | OUTPATIENT
Start: 2022-07-18 | End: 2022-07-25

## 2022-07-18 RX ORDER — ONDANSETRON 4 MG/1
4 TABLET, ORALLY DISINTEGRATING ORAL EVERY 8 HOURS PRN
Qty: 12 TABLET | Refills: 0 | Status: SHIPPED | OUTPATIENT
Start: 2022-07-18 | End: 2022-07-21

## 2022-07-18 RX ORDER — KETOROLAC TROMETHAMINE 15 MG/ML
15 INJECTION, SOLUTION INTRAMUSCULAR; INTRAVENOUS ONCE
Status: COMPLETED | OUTPATIENT
Start: 2022-07-18 | End: 2022-07-18

## 2022-07-18 RX ORDER — ACETAMINOPHEN 500 MG
1000 TABLET ORAL EVERY 6 HOURS
Qty: 56 TABLET | Refills: 0 | Status: SHIPPED | OUTPATIENT
Start: 2022-07-18 | End: 2022-07-25

## 2022-07-18 RX ADMIN — KETOROLAC TROMETHAMINE 15 MG: 15 INJECTION, SOLUTION INTRAMUSCULAR; INTRAVENOUS at 10:06

## 2022-07-18 RX ADMIN — HYDROMORPHONE HYDROCHLORIDE 0.5 MG: 1 INJECTION, SOLUTION INTRAMUSCULAR; INTRAVENOUS; SUBCUTANEOUS at 06:33

## 2022-07-18 RX ADMIN — ONDANSETRON 4 MG: 2 INJECTION INTRAMUSCULAR; INTRAVENOUS at 06:33

## 2022-07-18 RX ADMIN — ONDANSETRON 4 MG: 2 INJECTION INTRAMUSCULAR; INTRAVENOUS at 10:05

## 2022-07-18 RX ADMIN — HYDROMORPHONE HYDROCHLORIDE 0.5 MG: 1 INJECTION, SOLUTION INTRAMUSCULAR; INTRAVENOUS; SUBCUTANEOUS at 10:06

## 2022-07-18 ASSESSMENT — ENCOUNTER SYMPTOMS
BACK PAIN: 1
DYSURIA: 0
DIARRHEA: 0
CHILLS: 0
NAUSEA: 1
ABDOMINAL PAIN: 1
FEVER: 0
HEMATURIA: 0
SHORTNESS OF BREATH: 0

## 2022-07-18 NOTE — ED NOTES
Patient reports that she is feeling better following receiving analgesic. She is going to CT at this time.

## 2022-07-18 NOTE — ED PROVIDER NOTES
EMERGENCY DEPARTMENT ENCOUNTER      NAME: Jimmie Barrientos  AGE: 47 year old female  YOB: 1975  MRN: [id number]  EVALUATION DATE & TIME: 7/18/2022  5:58 AM    PCP: Garry Lu    ED PROVIDER: Lesly Qiu MD      Chief Complaint   Patient presents with     Abdominal Pain     Flank Pain     Chest Pain         FINAL IMPRESSION:  1. Ureterolithiasis          ED COURSE & MEDICAL DECISION MAKING:    Pertinent Labs & Imaging studies reviewed. (See chart for details)  47 year old female presents to the Emergency Department for evaluation of right flank pain.  The pain started around 2 in the morning.  It is a severe, constant pain.  She has never had anything similar.  No history of recent trauma.  The pain radiates into the right side of her abdomen.  She denies any associated constitutional symptoms or urinary symptoms.  CT scan of the abdomen pelvis is consistent with a ureteral stone.  No associated infection.  Patient's pain is improved after IV pain medications.  Patient will be discharged from the hospital, would recommend supportive management with pain management and follow-up with [facility].  Recommend return if she develops constitutional symptoms.    6:20 AM Introduced myself to patient. Obtained patient history and performed physical exam.   9:24 AM Rechecked and updated the patient   9:31 AM Rechecked and updated the patient. We discussed the plan for discharge and the patient is agreeable. Reviewed supportive cares, symptomatic treatment, outpatient follow up, and reasons to return to the Emergency Department. Patient to be discharged by ED RN.       At the conclusion of the encounter I discussed the results of all of the tests and the disposition. The questions were answered. The patient or family acknowledged understanding and was agreeable with the care plan.        MEDICATIONS GIVEN IN THE EMERGENCY:  Medications   ondansetron (ZOFRAN) injection 4 mg (4 mg Intravenous Given 7/18/22 0633)    HYDROmorphone (DILAUDID) injection 0.5 mg (0.5 mg Intravenous Given 7/18/22 0633)       NEW PRESCRIPTIONS STARTED AT TODAY'S ER VISIT  New Prescriptions    ACETAMINOPHEN (TYLENOL) 500 MG TABLET    Take 2 tablets (1,000 mg) by mouth every 6 hours for 7 days    DIMENHYDRINATE (DRAMAMINE) 50 MG TABLET    Take 1 tablet (50 mg) by mouth every 6 hours as needed for other (kidney stone pain management)    IBUPROFEN (ADVIL/MOTRIN) 200 MG TABLET    Take 2 tablets (400 mg) by mouth every 6 hours for 7 days    ONDANSETRON (ZOFRAN ODT) 4 MG ODT TAB    Take 1 tablet (4 mg) by mouth every 8 hours as needed for nausea    OXYCODONE (ROXICODONE) 5 MG TABLET    Take 1 tablet (5 mg) by mouth every 4 hours as needed for severe pain If pain is not improved with acetaminophen and ibuprofen.          =================================================================    HPI    Patient information was obtained from: patient and patient's daughter-in-law.     Use of : Daughter-in-law.         Jimmie Barrientos is a 47 year old female with a pertinent history of type 2 diabetes, hypokalemia, hypomagnesemia, and hepatitis B carrier who presents to this ED by car with family member for evaluation of back pain.     Per daughter-in-law, last night patient was sleeping, was heard getting going to the restroom at around 2 AM. Daughter-in-law found her sitting on the floor, complaining of severe back pain. The pain is in the right abdomen/ lower ribcage, and radiates to the patient's back. The pain is very dull, and makes her stomach very tight. The pain has caused her to feel very nauseous, and numb. She has not experienced pain like this before.     Patient also notes that she had a bowel movement this morning before presenting to the Emergency Department.    Patient denies any urinary problems, cough, shortness of breath, fever, and a history of smoking or drinking. She has not had any abdominal surgeries or recent injuries.         REVIEW OF  SYSTEMS   Review of Systems   Constitutional: Negative for chills and fever.   Respiratory: Negative for shortness of breath.    Cardiovascular: Positive for chest pain (lower right ribcage).   Gastrointestinal: Positive for abdominal pain (right ) and nausea. Negative for diarrhea.   Genitourinary: Negative for dysuria and hematuria.   Musculoskeletal: Positive for back pain (right abdomen, radiates to back).   All other systems reviewed and are negative.       PAST MEDICAL HISTORY:  Past Medical History:   Diagnosis Date     Abnormal maternal glucose tolerance, antepartum 1/8/2013     Abnormal maternal glucose tolerance, complicating pregnancy, childbirth, or the puerperium, unspecified as to episode of care 1/8/2013     Back pain      GERD (gastroesophageal reflux disease)      Hepatitis B carrier (H) 1/8/2013     History of stomach ulcers      Kidney stone        PAST SURGICAL HISTORY:  Past Surgical History:   Procedure Laterality Date     NO HISTORY OF SURGERY       NO PAST SURGERIES             CURRENT MEDICATIONS:    acetaminophen (TYLENOL) 500 MG tablet  dimenhyDRINATE (DRAMAMINE) 50 MG tablet  ibuprofen (ADVIL/MOTRIN) 200 MG tablet  ondansetron (ZOFRAN ODT) 4 MG ODT tab  oxyCODONE (ROXICODONE) 5 MG tablet  ASPIRIN NOT PRESCRIBED (INTENTIONAL)  blood glucose (NO BRAND SPECIFIED) lancets standard  blood glucose (NO BRAND SPECIFIED) test strip  blood glucose monitoring (NO BRAND SPECIFIED) meter device kit  EYE ITCH RELIEF 0.025 % ophthalmic solution  Global Inject Ease Lancets 30G MISC  neomycin-polymyxin-dexamethasone (MAXITROL) 3.5-22755-6.1 SUSP ophthalmic susp  potassium chloride ER (KLOR-CON M) 20 MEQ CR tablet  STATIN NOT PRESCRIBED (INTENTIONAL)        ALLERGIES:  Allergies   Allergen Reactions     No Known Allergies        FAMILY HISTORY:  Family History   Problem Relation Age of Onset     Family History Negative Other      Diabetes Mother      Diabetes Brother      Coronary Artery Disease No family  "hx of      Breast Cancer No family hx of      Colon Cancer No family hx of      Prostate Cancer No family hx of      Other Cancer No family hx of      Urolithiasis Brother      Clotting Disorder No family hx of      Gout No family hx of      Cancer No family hx of      Heart Disease No family hx of        SOCIAL HISTORY:   Social History     Socioeconomic History     Marital status:    Tobacco Use     Smoking status: Never Smoker     Smokeless tobacco: Never Used   Substance and Sexual Activity     Alcohol use: No     Alcohol/week: 0.0 standard drinks     Drug use: No       VITALS:  BP (!) 162/85   Pulse 72   Temp 98  F (36.7  C) (Temporal)   Resp 20   Ht 1.549 m (5' 1\")   Wt 68.9 kg (152 lb)   SpO2 100%   BMI 28.72 kg/m      PHYSICAL EXAM    Constitutional: Well developed, Well nourished, NAD  HENT: Normocephalic, Atraumatic, Bilateral external ears normal, Oropharynx normal, mucous membranes moist, Nose normal.   Neck- Normal range of motion, No tenderness, Supple, No stridor.  Eyes: PERRL, EOMI, Conjunctiva normal, No discharge.   Respiratory: Normal breath sounds, No respiratory distress  Cardiovascular: Normal heart rate, Regular rhythm  GI: Bowel sounds normal, Soft, Diffuse right side abdominal tenderness.   : Right CVA tenderness.   Musculoskeletal: No edema. Good range of motion in all major joints.  Integument: Warm, Dry, No erythema, No rash  Neurologic: Alert & oriented x 3, Normal motor function, Normal sensory function, No focal deficits noted. Normal gait.   Psychiatric: Affect normal, Judgment normal, Mood normal.      LAB:  All pertinent labs reviewed and interpreted.  Results for orders placed or performed during the hospital encounter of 07/18/22   CT Abdomen Pelvis w/o Contrast    Impression    IMPRESSION:   1.  Mild right hydronephrosis secondary to a 4 mm stone at the right ureteropelvic junction.             UA with Microscopic reflex to Culture    Specimen: Urine, Clean Catch "   Result Value Ref Range    Color Urine Yellow Colorless, Straw, Light Yellow, Yellow    Appearance Urine Turbid (A) Clear    Glucose Urine 30  (A) Negative mg/dL    Bilirubin Urine Negative Negative    Ketones Urine 40  (A) Negative mg/dL    Specific Gravity Urine 1.027 1.001 - 1.030    Blood Urine >1.0 mg/dL (A) Negative    pH Urine 5.5 5.0 - 7.0    Protein Albumin Urine 50  (A) Negative mg/dL    Urobilinogen Urine <2.0 <2.0 mg/dL    Nitrite Urine Negative Negative    Leukocyte Esterase Urine Negative Negative    Bacteria Urine Few (A) None Seen /HPF    Mucus Urine Present (A) None Seen /LPF    RBC Urine >182 (H) <=2 /HPF    WBC Urine 5 <=5 /HPF    Squamous Epithelials Urine 6 (H) <=1 /HPF   HCG qualitative urine   Result Value Ref Range    hCG Urine Qualitative Negative Negative   Extra Red Top Tube   Result Value Ref Range    Hold Specimen JIC    Extra Green Top (Lithium Heparin) Tube   Result Value Ref Range    Hold Specimen JIC    Extra Purple Top Tube   Result Value Ref Range    Hold Specimen JIC    Extra Blood Bank Purple Top Tube   Result Value Ref Range    Hold Specimen JIC    Extra Green Top (Lithium Heparin) ON ICE   Result Value Ref Range    Hold Specimen JIC    Basic metabolic panel   Result Value Ref Range    Sodium 135 (L) 136 - 145 mmol/L    Potassium 3.6 3.5 - 5.0 mmol/L    Chloride 105 98 - 107 mmol/L    Carbon Dioxide (CO2) 17 (L) 22 - 31 mmol/L    Anion Gap 13 5 - 18 mmol/L    Urea Nitrogen 14 8 - 22 mg/dL    Creatinine 0.78 0.60 - 1.10 mg/dL    Calcium 8.7 8.5 - 10.5 mg/dL    Glucose 207 (H) 70 - 125 mg/dL    GFR Estimate >90 >60 mL/min/1.73m2   CRP inflammation   Result Value Ref Range    CRP 0.3 0.0 - <0.8 mg/dL   Hepatic function panel   Result Value Ref Range    Bilirubin Total 0.6 0.0 - 1.0 mg/dL    Bilirubin Direct 0.2 <=0.5 mg/dL    Protein Total 7.8 6.0 - 8.0 g/dL    Albumin 3.8 3.5 - 5.0 g/dL    Alkaline Phosphatase 72 45 - 120 U/L    AST 18 0 - 40 U/L    ALT 18 0 - 45 U/L   Result  Value Ref Range    Lipase 28 0 - 52 U/L   CBC with platelets and differential   Result Value Ref Range    WBC Count 8.3 4.0 - 11.0 10e3/uL    RBC Count 4.87 3.80 - 5.20 10e6/uL    Hemoglobin 11.1 (L) 11.7 - 15.7 g/dL    Hematocrit 36.6 35.0 - 47.0 %    MCV 75 (L) 78 - 100 fL    MCH 22.8 (L) 26.5 - 33.0 pg    MCHC 30.3 (L) 31.5 - 36.5 g/dL    RDW 16.5 (H) 10.0 - 15.0 %    Platelet Count 206 150 - 450 10e3/uL    % Neutrophils 72 %    % Lymphocytes 18 %    % Monocytes 8 %    % Eosinophils 0 %    % Basophils 1 %    % Immature Granulocytes 1 %    NRBCs per 100 WBC 0 <1 /100    Absolute Neutrophils 6.1 1.6 - 8.3 10e3/uL    Absolute Lymphocytes 1.5 0.8 - 5.3 10e3/uL    Absolute Monocytes 0.6 0.0 - 1.3 10e3/uL    Absolute Eosinophils 0.0 0.0 - 0.7 10e3/uL    Absolute Basophils 0.1 0.0 - 0.2 10e3/uL    Absolute Immature Granulocytes 0.1 <=0.4 10e3/uL    Absolute NRBCs 0.0 10e3/uL       RADIOLOGY:  Reviewed all pertinent imaging. Please see official radiology report.  CT Abdomen Pelvis w/o Contrast   Final Result   IMPRESSION:    1.  Mild right hydronephrosis secondary to a 4 mm stone at the right ureteropelvic junction.                           I, Ronak Swartz, am serving as a scribe to document services personally performed by Lesly Qiu, based on my observation and the provider's statements to me. I, Lesly Qiu MD, attest that Ronak Swartz is acting in a scribe capacity, has observed my performance of the services and has documented them in accordance with my direction.    Lesly Qiu MD  Emergency Medicine  [facility] EMERGENCY DEPARTMENT  [address] [place] [id number] [phone]       Lesly Qiu MD  07/18/22 1037

## 2022-07-18 NOTE — ED TRIAGE NOTES
Patient woke up at 0200 with a sudden onset of right lower abdominal pain that radiates to the right flank that is constant. Denies urinary symptoms. Patient also reports nausea and states that she is experiencing some chest pain.      Triage Assessment     Row Name 07/18/22 0405       Triage Assessment (Adult)    Airway WDL WDL       Respiratory WDL    Respiratory WDL WDL       Skin Circulation/Temperature WDL    Skin Circulation/Temperature WDL WDL       Cardiac WDL    Cardiac WDL WDL       Peripheral/Neurovascular WDL    Peripheral Neurovascular WDL WDL       Cognitive/Neuro/Behavioral WDL    Cognitive/Neuro/Behavioral WDL WDL

## 2022-07-18 NOTE — ED NOTES
Triage RN updated writer that patient is currently menstruating and that could be the reason for some of the blood in the urine. Writer informed MD.

## 2022-07-19 ENCOUNTER — PATIENT OUTREACH (OUTPATIENT)
Dept: FAMILY MEDICINE | Facility: CLINIC | Age: 47
End: 2022-07-19

## 2022-07-19 LAB
ATRIAL RATE - MUSE: 81 BPM
DIASTOLIC BLOOD PRESSURE - MUSE: NORMAL MMHG
INTERPRETATION ECG - MUSE: NORMAL
P AXIS - MUSE: 64 DEGREES
PR INTERVAL - MUSE: 148 MS
QRS DURATION - MUSE: 76 MS
QT - MUSE: 430 MS
QTC - MUSE: 499 MS
R AXIS - MUSE: 90 DEGREES
SYSTOLIC BLOOD PRESSURE - MUSE: NORMAL MMHG
T AXIS - MUSE: 59 DEGREES
VENTRICULAR RATE- MUSE: 81 BPM

## 2022-07-19 NOTE — PROGRESS NOTES
Clinic Care Coordination Contact    Follow Up Progress Note      Assessment: The pt was recently in the ED, I called to check up on the pt, and help the pt setup a ED follow up. The pt was at [facility] for abdominal pain, flank pain, and chest pain. I called and talked to the pt, pt stated that she had a kidney stone. Pt stated that she was able to urinate it out. Pt stated that she did not feel that she needs a follow up.     Care Gaps:    Health Maintenance Due   Topic Date Due     PREVENTIVE CARE VISIT  Never done     ADVANCE CARE PLANNING  Never done     MAMMO SCREENING  Never done     COVID-19 Vaccine (1) Never done     Pneumococcal Vaccine: Pediatrics (0 to 5 Years) and At-Risk Patients (6 to 64 Years) (1 - PCV) Never done     COLORECTAL CANCER SCREENING  Never done     HEPATITIS C SCREENING  Never done     HPV TEST  02/22/2021     PAP  02/22/2021     PHQ-2 (once per calendar year)  01/01/2022     EYE EXAM  02/01/2022     DTAP/TDAP/TD IMMUNIZATION (2 - Td or Tdap) 04/19/2022     LIPID  05/10/2022     MICROALBUMIN  05/10/2022     DIABETIC FOOT EXAM  05/10/2022           Goals addressed this encounter:    Goals Addressed    None         Intervention/Education provided during outreach:               Plan:     Care Coordinator will follow up in month

## 2022-08-15 DIAGNOSIS — N20.1 CALCULUS OF URETER: Primary | ICD-10-CM

## 2022-08-21 ENCOUNTER — HEALTH MAINTENANCE LETTER (OUTPATIENT)
Age: 47
End: 2022-08-21

## 2022-11-21 ENCOUNTER — HEALTH MAINTENANCE LETTER (OUTPATIENT)
Age: 47
End: 2022-11-21

## 2022-12-21 ENCOUNTER — OFFICE VISIT (OUTPATIENT)
Dept: FAMILY MEDICINE | Facility: CLINIC | Age: 47
End: 2022-12-21
Payer: COMMERCIAL

## 2022-12-21 VITALS
HEIGHT: 61 IN | OXYGEN SATURATION: 100 % | BODY MASS INDEX: 27.75 KG/M2 | DIASTOLIC BLOOD PRESSURE: 78 MMHG | SYSTOLIC BLOOD PRESSURE: 121 MMHG | HEART RATE: 79 BPM | RESPIRATION RATE: 18 BRPM | WEIGHT: 147 LBS

## 2022-12-21 DIAGNOSIS — E87.6 HYPOKALEMIA: ICD-10-CM

## 2022-12-21 DIAGNOSIS — N92.4 EXCESSIVE BLEEDING IN PREMENOPAUSAL PERIOD: ICD-10-CM

## 2022-12-21 DIAGNOSIS — E11.9 TYPE 2 DIABETES MELLITUS WITHOUT COMPLICATION, WITHOUT LONG-TERM CURRENT USE OF INSULIN (H): ICD-10-CM

## 2022-12-21 DIAGNOSIS — M79.603 UPPER AND LOWER EXTREMITY PAIN: Primary | ICD-10-CM

## 2022-12-21 DIAGNOSIS — M79.606 UPPER AND LOWER EXTREMITY PAIN: Primary | ICD-10-CM

## 2022-12-21 DIAGNOSIS — N89.8 VAGINAL DISCHARGE: ICD-10-CM

## 2022-12-21 LAB
ANION GAP SERPL CALCULATED.3IONS-SCNC: 13 MMOL/L (ref 7–15)
BUN SERPL-MCNC: 6.6 MG/DL (ref 6–20)
CALCIUM SERPL-MCNC: 8.9 MG/DL (ref 8.6–10)
CHLORIDE SERPL-SCNC: 104 MMOL/L (ref 98–107)
CHOLEST SERPL-MCNC: 143 MG/DL
CREAT SERPL-MCNC: 0.53 MG/DL (ref 0.51–0.95)
CREAT UR-MCNC: 275 MG/DL
DEPRECATED HCO3 PLAS-SCNC: 23 MMOL/L (ref 22–29)
ERYTHROCYTE [DISTWIDTH] IN BLOOD BY AUTOMATED COUNT: 16.8 % (ref 10–15)
GFR SERPL CREATININE-BSD FRML MDRD: >90 ML/MIN/1.73M2
GLUCOSE SERPL-MCNC: 150 MG/DL (ref 70–99)
HBA1C MFR BLD: 7.3 % (ref 0–5.6)
HCT VFR BLD AUTO: 36 % (ref 35–47)
HDLC SERPL-MCNC: 61 MG/DL
HGB BLD-MCNC: 10.6 G/DL (ref 11.7–15.7)
LDLC SERPL CALC-MCNC: 65 MG/DL
MCH RBC QN AUTO: 22.1 PG (ref 26.5–33)
MCHC RBC AUTO-ENTMCNC: 29.4 G/DL (ref 31.5–36.5)
MCV RBC AUTO: 75 FL (ref 78–100)
MICROALBUMIN UR-MCNC: 15.5 MG/L
MICROALBUMIN/CREAT UR: 5.64 MG/G CR (ref 0–25)
NONHDLC SERPL-MCNC: 82 MG/DL
PLATELET # BLD AUTO: 189 10E3/UL (ref 150–450)
POTASSIUM SERPL-SCNC: 3.5 MMOL/L (ref 3.4–5.3)
RBC # BLD AUTO: 4.8 10E6/UL (ref 3.8–5.2)
SODIUM SERPL-SCNC: 140 MMOL/L (ref 136–145)
TRIGL SERPL-MCNC: 83 MG/DL
WBC # BLD AUTO: 5 10E3/UL (ref 4–11)

## 2022-12-21 PROCEDURE — 85027 COMPLETE CBC AUTOMATED: CPT | Performed by: STUDENT IN AN ORGANIZED HEALTH CARE EDUCATION/TRAINING PROGRAM

## 2022-12-21 PROCEDURE — 80048 BASIC METABOLIC PNL TOTAL CA: CPT | Performed by: STUDENT IN AN ORGANIZED HEALTH CARE EDUCATION/TRAINING PROGRAM

## 2022-12-21 PROCEDURE — 82570 ASSAY OF URINE CREATININE: CPT | Performed by: STUDENT IN AN ORGANIZED HEALTH CARE EDUCATION/TRAINING PROGRAM

## 2022-12-21 PROCEDURE — 83036 HEMOGLOBIN GLYCOSYLATED A1C: CPT | Performed by: STUDENT IN AN ORGANIZED HEALTH CARE EDUCATION/TRAINING PROGRAM

## 2022-12-21 PROCEDURE — 80061 LIPID PANEL: CPT | Performed by: STUDENT IN AN ORGANIZED HEALTH CARE EDUCATION/TRAINING PROGRAM

## 2022-12-21 PROCEDURE — 36415 COLL VENOUS BLD VENIPUNCTURE: CPT | Performed by: STUDENT IN AN ORGANIZED HEALTH CARE EDUCATION/TRAINING PROGRAM

## 2022-12-21 PROCEDURE — 82043 UR ALBUMIN QUANTITATIVE: CPT | Performed by: STUDENT IN AN ORGANIZED HEALTH CARE EDUCATION/TRAINING PROGRAM

## 2022-12-21 PROCEDURE — 99214 OFFICE O/P EST MOD 30 MIN: CPT | Mod: GC | Performed by: STUDENT IN AN ORGANIZED HEALTH CARE EDUCATION/TRAINING PROGRAM

## 2022-12-21 NOTE — PROGRESS NOTES
"  Assessment & Plan     Upper and lower extremity pain  Appears to be mild \"needle poke\" sensation over bilateral UE and LE extremities that occur 3-4 times daily. Not consistent with diabetic neuropathy. Will check labs, offered reassurance. Seems to be improving from last week.   - TSH with free T4 reflex; Future  - Vitamin B12; Future    Type 2 diabetes mellitus without complication, without long-term current use of insulin (H)  Has not been on metformin, at least not presently. Last A1c 1 year ago just above 7. Would start metformin pending A1c recheck.   - Hemoglobin A1c  - Lipid Profile  - Albumin Random Urine Quantitative with Creat Ratio  -Diabetic eye exam needed, will perform foot exam at next visit.    Vaginal discharge  Not presently experiencing any symptoms. Does tend to have occasional increase in whitish discharge and itching, but it does go away on its own. Discussed nature of bacterial reina, probiotics.    Excessive bleeding in premenopausal period  Pt notes heavy menstrual bleeding, and that when she is on her period she tends to get dizzy when she stands up. Will check Hgb  - CBC with platelets; Future  - CBC with platelets    Hypokalemia  History of low potassium, no longer on supplementation. Labs to check levels.   - Basic metabolic panel; Future  - Basic metabolic panel    956}     BMI:   Estimated body mass index is 27.78 kg/m  as calculated from the following:    Height as of this encounter: 1.549 m (5' 1\").    Weight as of this encounter: 66.7 kg (147 lb).   Weight management plan: Will discuss at future visit    FUTURE APPOINTMENTS:       - Follow-up visit in 1-2 weeks for annual physical: due for PAP, mammogram, CR cancer screening, routine vaccines, Tdap,     No follow-ups on file.    Garry Scruggs DO  [facility]    Kwesi Samuel is a 47 year old with T2DM, GERD, presenting for the following health issues:  Follow Up (From last visit, sometime feel " "needle poke on both legs) and Vaginal Problem (White discharge per pt)      HPI       Describes some needle pokes in legs and arms, started last week. Has improved since then. If she is not doing anything then she feels it a little bit. 3-4 times if she sits quietly, otherwise she does not notice it. Describes some mild weakness in legs No new detergents or soaps.     Periods have been heavy, tend to feel dizzy when she stands up too fast. Last period was 12/6-12/7.     Occasionally notes vaginal discharge, itching. Not occurring right now. Comes and goes. No odor. Denies pelvic pain. Denies concern for STI.         Review of Systems         Objective    /78   Pulse 79   Resp 18   Ht 1.549 m (5' 1\")   Wt 66.7 kg (147 lb)   SpO2 100%   BMI 27.78 kg/m    Body mass index is 27.78 kg/m .  Physical Exam   GENERAL: healthy, alert and no distress  RESP: lungs clear to auscultation - no rales, rhonchi or wheezes  CV: regular rate and rhythm, normal S1 S2, no S3 or S4, no murmur, click or rub, no peripheral edema and peripheral pulses strong  MS: no gross musculoskeletal defects noted, no edema  SKIN: no suspicious lesions or rashes  NEURO: Normal strength and tone in UE/LE, LE reflexes 2/4 bilat, mentation intact and speech normal            "

## 2022-12-26 NOTE — PROGRESS NOTES
Preceptor Attestation:  Patient's case reviewed and discussed with Garry Scruggs DO resident and I evaluated the patient. I agree with written assessment and plan of care.  Supervising Physician:  Henok Smith MD, MD [facility]

## 2022-12-28 ENCOUNTER — OFFICE VISIT (OUTPATIENT)
Dept: FAMILY MEDICINE | Facility: CLINIC | Age: 47
End: 2022-12-28
Payer: COMMERCIAL

## 2022-12-28 VITALS
WEIGHT: 148 LBS | BODY MASS INDEX: 27.94 KG/M2 | HEIGHT: 61 IN | HEART RATE: 90 BPM | OXYGEN SATURATION: 99 % | SYSTOLIC BLOOD PRESSURE: 122 MMHG | DIASTOLIC BLOOD PRESSURE: 76 MMHG | TEMPERATURE: 98.8 F | RESPIRATION RATE: 20 BRPM

## 2022-12-28 DIAGNOSIS — N89.8 VAGINAL ITCHING: ICD-10-CM

## 2022-12-28 DIAGNOSIS — Z00.00 ROUTINE GENERAL MEDICAL EXAMINATION AT A HEALTH CARE FACILITY: Primary | ICD-10-CM

## 2022-12-28 DIAGNOSIS — N76.0 BACTERIAL VAGINOSIS: ICD-10-CM

## 2022-12-28 DIAGNOSIS — Z12.11 SCREEN FOR COLON CANCER: ICD-10-CM

## 2022-12-28 DIAGNOSIS — B96.89 BACTERIAL VAGINOSIS: ICD-10-CM

## 2022-12-28 DIAGNOSIS — E11.9 TYPE 2 DIABETES MELLITUS WITHOUT COMPLICATION, WITHOUT LONG-TERM CURRENT USE OF INSULIN (H): ICD-10-CM

## 2022-12-28 DIAGNOSIS — D64.9 ANEMIA, UNSPECIFIED TYPE: ICD-10-CM

## 2022-12-28 DIAGNOSIS — M79.603 UPPER AND LOWER EXTREMITY PAIN: ICD-10-CM

## 2022-12-28 DIAGNOSIS — Z12.4 CERVICAL CANCER SCREENING: ICD-10-CM

## 2022-12-28 DIAGNOSIS — Z12.31 VISIT FOR SCREENING MAMMOGRAM: ICD-10-CM

## 2022-12-28 DIAGNOSIS — Z11.59 NEED FOR HEPATITIS C SCREENING TEST: ICD-10-CM

## 2022-12-28 DIAGNOSIS — M79.606 UPPER AND LOWER EXTREMITY PAIN: ICD-10-CM

## 2022-12-28 LAB
CLUE CELLS: PRESENT
FERRITIN SERPL-MCNC: 9 NG/ML (ref 6–175)
IRON BINDING CAPACITY (ROCHE): 390 UG/DL (ref 240–430)
IRON SATN MFR SERPL: 7 % (ref 15–46)
IRON SERPL-MCNC: 26 UG/DL (ref 37–145)
TRICHOMONAS, WET PREP: ABNORMAL
TSH SERPL DL<=0.005 MIU/L-ACNC: 2.38 UIU/ML (ref 0.3–4.2)
VIT B12 SERPL-MCNC: 436 PG/ML (ref 232–1245)
WBC'S/HIGH POWER FIELD, WET PREP: ABNORMAL
YEAST, WET PREP: ABNORMAL

## 2022-12-28 PROCEDURE — 82728 ASSAY OF FERRITIN: CPT | Performed by: STUDENT IN AN ORGANIZED HEALTH CARE EDUCATION/TRAINING PROGRAM

## 2022-12-28 PROCEDURE — 84443 ASSAY THYROID STIM HORMONE: CPT | Performed by: STUDENT IN AN ORGANIZED HEALTH CARE EDUCATION/TRAINING PROGRAM

## 2022-12-28 PROCEDURE — 99214 OFFICE O/P EST MOD 30 MIN: CPT | Mod: 25 | Performed by: STUDENT IN AN ORGANIZED HEALTH CARE EDUCATION/TRAINING PROGRAM

## 2022-12-28 PROCEDURE — 87210 SMEAR WET MOUNT SALINE/INK: CPT | Performed by: STUDENT IN AN ORGANIZED HEALTH CARE EDUCATION/TRAINING PROGRAM

## 2022-12-28 PROCEDURE — 36415 COLL VENOUS BLD VENIPUNCTURE: CPT | Performed by: STUDENT IN AN ORGANIZED HEALTH CARE EDUCATION/TRAINING PROGRAM

## 2022-12-28 PROCEDURE — 90471 IMMUNIZATION ADMIN: CPT | Performed by: STUDENT IN AN ORGANIZED HEALTH CARE EDUCATION/TRAINING PROGRAM

## 2022-12-28 PROCEDURE — 83540 ASSAY OF IRON: CPT | Performed by: STUDENT IN AN ORGANIZED HEALTH CARE EDUCATION/TRAINING PROGRAM

## 2022-12-28 PROCEDURE — 86803 HEPATITIS C AB TEST: CPT | Performed by: STUDENT IN AN ORGANIZED HEALTH CARE EDUCATION/TRAINING PROGRAM

## 2022-12-28 PROCEDURE — G0145 SCR C/V CYTO,THINLAYER,RESCR: HCPCS | Performed by: STUDENT IN AN ORGANIZED HEALTH CARE EDUCATION/TRAINING PROGRAM

## 2022-12-28 PROCEDURE — 99396 PREV VISIT EST AGE 40-64: CPT | Mod: 25 | Performed by: STUDENT IN AN ORGANIZED HEALTH CARE EDUCATION/TRAINING PROGRAM

## 2022-12-28 PROCEDURE — 82607 VITAMIN B-12: CPT | Performed by: STUDENT IN AN ORGANIZED HEALTH CARE EDUCATION/TRAINING PROGRAM

## 2022-12-28 PROCEDURE — 90686 IIV4 VACC NO PRSV 0.5 ML IM: CPT | Performed by: STUDENT IN AN ORGANIZED HEALTH CARE EDUCATION/TRAINING PROGRAM

## 2022-12-28 PROCEDURE — 83550 IRON BINDING TEST: CPT | Performed by: STUDENT IN AN ORGANIZED HEALTH CARE EDUCATION/TRAINING PROGRAM

## 2022-12-28 PROCEDURE — 87624 HPV HI-RISK TYP POOLED RSLT: CPT | Performed by: STUDENT IN AN ORGANIZED HEALTH CARE EDUCATION/TRAINING PROGRAM

## 2022-12-28 RX ORDER — METRONIDAZOLE 500 MG/1
500 TABLET ORAL 2 TIMES DAILY
Qty: 14 TABLET | Refills: 0 | Status: SHIPPED | OUTPATIENT
Start: 2022-12-28 | End: 2023-01-04

## 2022-12-28 ASSESSMENT — ENCOUNTER SYMPTOMS
MYALGIAS: 0
PARESTHESIAS: 0
DYSURIA: 0
DIZZINESS: 0
CONSTIPATION: 0
ABDOMINAL PAIN: 0
NERVOUS/ANXIOUS: 0
HEARTBURN: 1
WEAKNESS: 0
FEVER: 0
DIARRHEA: 0
FREQUENCY: 0
ARTHRALGIAS: 0
HEMATOCHEZIA: 0
CHILLS: 0
SHORTNESS OF BREATH: 0
EYE PAIN: 0
PALPITATIONS: 1
NAUSEA: 0
HEMATURIA: 0
JOINT SWELLING: 0
SORE THROAT: 0
BREAST MASS: 0
HEADACHES: 0
COUGH: 0

## 2022-12-28 NOTE — PROGRESS NOTES
SUBJECTIVE:   CC: Jimmie is an 47 year old who presents for preventive health visit.   {Split Bill scripting  The purpose of this visit is to discuss your medical history and prevent health problems before you are sick. You may be responsible for a co-pay, coinsurance, or deductible if your visit today includes services such as checking on a sore throat, having an x-ray or lab test, or treating and evaluating a new or existing condition :996596}  Patient has been advised of split billing requirements and indicates understanding: Yes  HPI  {Add if <65 person on Medicare  - Required Questions (Optional):682857}  {Outside tests to abstract? :099396}    {additional problems to add (Optional):380506}    Today's PHQ-2 Score:   PHQ-2 ( 1999 Pfizer) 12/28/2022   Q1: Little interest or pleasure in doing things 0   Q2: Feeling down, depressed or hopeless 0   PHQ-2 Score 0   PHQ-2 Total Score (12-17 Years)- Positive if 3 or more points; Administer PHQ-A if positive -   Q1: Little interest or pleasure in doing things Not at all   Q2: Feeling down, depressed or hopeless Not at all   PHQ-2 Score 0       Have you ever done Advance Care Planning? (For example, a Health Directive, POLST, or a discussion with a medical provider or your loved ones about your wishes): { :061102}    Social History     Tobacco Use     Smoking status: Never     Smokeless tobacco: Never   Substance Use Topics     Alcohol use: No     Alcohol/week: 0.0 standard drinks     {Rooming Staff- Complete this question if Prescreen response is not shown below for today's visit. If you drink alcohol do you typically have >3 drinks per day or >7 drinks per week? (Optional):626524}    Alcohol Use 12/28/2022   Prescreen: >3 drinks/day or >7 drinks/week? No   {add AUDIT responses (Optional) (A score of 7 for adult men is an indication of hazardous drinking; a score of 8 or more is an indication of an alcohol use disorder.  A score of 7 or more for adult women is an  "indication of hazardous drinking or an alchohol use disorder):645398}    Reviewed orders with patient.  Reviewed health maintenance and updated orders accordingly - { :090447::\"Yes\"}  {Chronicprobdata (optional):081222}    Breast Cancer Screening:    FHS-7: No flowsheet data found.  {If any of the questions to the BCRA (FHS-7) are answered yes, consider ordering referral for genetic counseling (Optional) :569339::\"click delete button to remove this line now\"}  {AMB Mammogram Decision Support (Optional) :564900}  Pertinent mammograms are reviewed under the imaging tab.    History of abnormal Pap smear: { :541867}     Reviewed and updated as needed this visit by clinical staff   Tobacco  Allergies  Meds              Reviewed and updated as needed this visit by Provider                 {HISTORY OPTIONS (Optional):640719}    Review of Systems  {FEMALE ROS (Optional):880250}     OBJECTIVE:   /76   Pulse 90   Temp 98.8  F (37.1  C)   Resp 20   Ht 1.549 m (5' 1\")   Wt 67.1 kg (148 lb)   SpO2 99%   BMI 27.96 kg/m    Physical Exam  {Exam Choices (Optional):140498}    {Diagnostic Test Results (Optional):844186::\"Diagnostic Test Results:\",\"Labs reviewed in Epic\"}    ASSESSMENT/PLAN:   {Diag Picklist:314719}    {Patient advised of split billing (Optional):102945}      COUNSELING:  {FEMALE COUNSELING MESSAGES:117637::\"Reviewed preventive health counseling, as reflected in patient instructions\"}        She reports that she has never smoked. She has never used smokeless tobacco.      {Counseling Resources  [place] Preventive Services Task Force  Cholesterol Screening  Health diet/nutrition  Pooled Cohorts Equation Calculator  USDA's MyPlate  ASA Prophylaxis  Lung CA Screening  Osteoporosis prevention/bone health :982179}  {Breast Cancer Risk Calculator  BRCA-Related Cancer Risk Assessment FHS-7 Tool :321521}  Garry Scruggs DO  [facility]  "

## 2022-12-28 NOTE — LETTER
January 10, 2023      Jimmie Barrientos  [address] [place] [address]        Dear ,    We are writing to inform you of your test results.    Thank you for visiting our clinic on Dec 28, 2022.     The result of your recent labwork has returned. Your Fecal occult blood (FIT) test came back normal. This is good news! This means that you do not have to repeat colorectal cancer screening until 1 year from now.       If you have any questions or concerns, please feel free to give us a call.       Resulted Orders   TSH with free T4 reflex   Result Value Ref Range    TSH 2.38 0.30 - 4.20 uIU/mL   Vitamin B12   Result Value Ref Range    Vitamin B12 436 232 - 1,245 pg/mL   Ferritin   Result Value Ref Range    Ferritin 9 6 - 175 ng/mL   Iron and iron binding capacity   Result Value Ref Range    Iron 26 (L) 37 - 145 ug/dL    Iron Binding Capacity 390 240 - 430 ug/dL    Iron Sat Index 7 (L) 15 - 46 %   Hepatitis C Screen Reflex to HCV RNA Quant and Genotype   Result Value Ref Range    Hepatitis C Antibody Nonreactive Nonreactive    Narrative    Assay performance characteristics have not been established for newborns, infants, and children.   PAP screen with HPV - recommended age 30 - 65 years   Result Value Ref Range    Interpretation        Negative for Intraepithelial Lesion or Malignancy (NILM)    Comment         Papanicolaou Test Limitations:  Cervical cytology is a screening test with limited sensitivity, and regular screening is critical for cancer prevention.  Pap tests are primarily effective for the diagnosis/prevention of squamous cell carcinoma, not adenocarcinoma or other cancers.        Specimen Adequacy       Satisfactory for evaluation, endocervical/transformation zone component present    Clinical Information       none      LMP/Menopause Date       12/5/2022      Reflex Testing Yes regardless of result     Previous Abnormal?       No      Performing Labs       The technical component of this testing was  completed at [facility]     Wet preparation   Result Value Ref Range    Trichomonas Absent Absent    Yeast Absent Absent    Clue Cells Present (A) Absent    WBCs/high power field 4+ (A) None    Narrative    No odor  No ph  Many bacteria   Fecal colorectal cancer screen FIT   Result Value Ref Range    Occult Blood Screen FIT Negative Negative   HPV High Risk Types DNA Cervical   Result Value Ref Range    Other HR HPV Negative Negative    HPV16 DNA Negative Negative    HPV18 DNA Negative Negative    FINAL DIAGNOSIS       This patient's sample is negative for HPV DNA.        This test was developed and its performance characteristics determined by the [facility]. It has not been cleared or approved by the FDA. The laboratory is regulated under CLIA as qualified to perform high-complexity testing. This test is used for clinical purposes. It should not be regarded as investigational or for research.    METHODOLOGY: The Roche Kris 4800 system uses automated extraction, simultaneous amplification of HPV (L1 region) and beta-globin, followed by real time detection of fluorescent labeled HPV and beta globin using specific oligonucleotide probes. The test specifically identifies types HPV 16 DNA and HPV 18 DNA while concurrently detecting the rest of the high risk types (31, 33, 35, 39, 45, 51, 52, 56, 58, 59, 66 or 68).    COMMENTS: This test is not intended for use as a screening device for woman under age 30 with normal cervical cytology. Results should be correlated with cytologic and histologic findings. Close clinical followup is recommended.           If you have any questions or concerns, please call the clinic at the number listed above.       Sincerely,      Hung Sanders MD

## 2022-12-28 NOTE — PROGRESS NOTES
SUBJECTIVE:   CC: Jimmie is an 47 year old who presents for preventive health visit.   Healthy Habits:     Getting at least 3 servings of Calcium per day:  NO    Bi-annual eye exam:  Yes    Dental care twice a year:  NO    Sleep apnea or symptoms of sleep apnea:  None    Diet:  Regular (no restrictions)    Frequency of exercise:  2-3 days/week    Duration of exercise:  15-30 minutes    Taking medications regularly:  Yes    Medication side effects:  None    PHQ-2 Total Score: 0    Additional concerns today:  No      Pt notes feeling dizzy when she is on her periods, particularly when she stands up quickly. She has heavy menstrual bleeding, per her report. This has been the case for quite a while.     She also describes vaginal itching that comes and goes. It is present today. It is not associated with increased discharge or pain. No odor. No concerns today for STIs.         Today's PHQ-2 Score:   PHQ-2 ( 1999 Pfizer) 12/28/2022   Q1: Little interest or pleasure in doing things 0   Q2: Feeling down, depressed or hopeless 0   PHQ-2 Score 0   PHQ-2 Total Score (12-17 Years)- Positive if 3 or more points; Administer PHQ-A if positive -   Q1: Little interest or pleasure in doing things Not at all   Q2: Feeling down, depressed or hopeless Not at all   PHQ-2 Score 0       Have you ever done Advance Care Planning? (For example, a Health Directive, POLST, or a discussion with a medical provider or your loved ones about your wishes): No, advance care planning information given to patient to review.  Patient plans to discuss their wishes with loved ones or provider.      Social History     Tobacco Use     Smoking status: Never     Smokeless tobacco: Never   Substance Use Topics     Alcohol use: No     Alcohol/week: 0.0 standard drinks       Alcohol Use 12/28/2022   Prescreen: >3 drinks/day or >7 drinks/week? No       Reviewed orders with patient.  Reviewed health maintenance and updated orders accordingly - Yes    Breast  "Cancer Screening: Has never had family history of breast cancer.     FHS-7: No flowsheet data found.  Mammogram Screening: Recommended annual mammography  Pertinent mammograms are reviewed under the imaging tab.    History of abnormal Pap smear: NO - age 30-65 PAP every 5 years with negative HPV co-testing recommended     Reviewed and updated as needed this visit by clinical staff   Tobacco  Allergies  Meds              Reviewed and updated as needed this visit by Provider                     Review of Systems   Constitutional: Negative for chills and fever.   HENT: Negative for congestion, ear pain, hearing loss and sore throat.    Eyes: Negative for pain and visual disturbance.   Respiratory: Negative for cough and shortness of breath.    Cardiovascular: Positive for chest pain and palpitations. Negative for peripheral edema.   Gastrointestinal: Positive for heartburn. Negative for abdominal pain, constipation, diarrhea, hematochezia and nausea.   Breasts:  Negative for tenderness, breast mass and discharge.   Genitourinary: Positive for pelvic pain and vaginal discharge. Negative for dysuria, frequency, genital sores, hematuria, urgency and vaginal bleeding.   Musculoskeletal: Negative for arthralgias, joint swelling and myalgias.   Skin: Negative for rash.   Neurological: Negative for dizziness, weakness, headaches and paresthesias.   Psychiatric/Behavioral: Negative for mood changes. The patient is not nervous/anxious.         OBJECTIVE:   /76   Pulse 90   Temp 98.8  F (37.1  C)   Resp 20   Ht 1.549 m (5' 1\")   Wt 67.1 kg (148 lb)   SpO2 99%   BMI 27.96 kg/m    Physical Exam  GENERAL: healthy, alert and no distress  EYES: Eyes grossly normal to inspection, PERRL and conjunctivae and sclerae normal  HENT: ear canals and TM's normal, nose and mouth without ulcers or lesions  NECK: no adenopathy, no asymmetry, masses, or scars and thyroid normal to palpation  RESP: lungs clear to auscultation - no " rales, rhonchi or wheezes  CV: regular rate and rhythm, normal S1 S2, no S3 or S4, no murmur, click or rub, no peripheral edema and peripheral pulses strong  ABDOMEN: soft, nontender, no hepatosplenomegaly, no masses and bowel sounds normal  : normal external female genitalia. Vaginal canal mucosa pink with physiologic discharge. Cervix with some tan discoloration at 10-12o'clock position. Otherwise normal appearance.   MS: no gross musculoskeletal defects noted, no edema  SKIN: no suspicious lesions or rashes  NEURO: Normal strength and tone, mentation intact and speech normal  PSYCH: mentation appears normal, affect normal/bright    ASSESSMENT/PLAN:   (Z00.00) Routine general medical examination at a health care facility  (primary encounter diagnosis)  Comment: Mammogram, FIT test, and PAP performed today. Pt declined tetanus, pcv, and COVID vaccine today.   Plan: INFLUENZA VACCINE IM > 6 MONTHS VALENT IIV4         (AFLURIA/FLUZONE)  - Return in 1 year for annual visit  - Follow up pending screening results.     (D64.9) Anemia, unspecified type  Comment: Checked Iron levels due to microcytic anemia. Reza tart iron supplementation. Likely in the setting of heavy periods.   Plan: Ferritin, Iron and iron binding capacity,         ferrous sulfate (FEROSUL) 325 (65 Fe) MG tablet    (E11.9) Type 2 diabetes mellitus without complication, without long-term current use of insulin (H)  Comment: Recent A1c>7. Would start metformin, with recheck of A1c in 3 months. Encouraged lifestyle mods. Did have eye exam 2022 Everything looked normal.    Plan: metFORMIN (GLUCOPHAGE) 500 MG tablet BID       (N89.8) Vaginal itching  Comment: Wet prep positive for clue cells, will go ahead and treat for BV  Plan: Wet preparation, metroNIDAZOLE (FLAGYL) 500 MG         tablet     (N76.0,  B96.89) Bacterial vaginosis  Comment: See above  Plan: metroNIDAZOLE (FLAGYL) 500 MG tablet      (Z12.31) Visit for screening mammogram  Plan: MA  SCREENING DIGITAL BILAT - Future  (s+30)    (Z12.4) Cervical cancer screening  Plan: PAP screen with HPV - recommended age 30 - 65         years, HPV Hold (Lab Only)    (Z12.11) Screen for colon cancer  Plan: Fecal colorectal cancer screen FIT    (Z11.59) Need for hepatitis C screening test  Plan: Hepatitis C Screen Reflex to HCV RNA Quant and         Genotype    (M79.603,  M79.606) Upper and lower extremity pain  Comment: Needle poke sensation. Does not appear to be too bothersome. Concern for neuropathy. Discussed at last visit.  Plan: TSH and B12 checked      COUNSELING:  Reviewed preventive health counseling, as reflected in patient instructions       Regular exercise       Healthy diet/nutrition       Colorectal Cancer Screening       mammogram, cervical cancer screening        She reports that she has never smoked. She has never used smokeless tobacco.          Garry Scruggs DO  [facility]    Precepted with Dr. Hung Sanders MD

## 2022-12-28 NOTE — LETTER
January 4, 2023      Jimmie Barrientos  [address] [place] [address]        Dear ,    We are writing to inform you of your test results.    Your cervical cancer screening came back normal, negative for cancer cells. This is good news! Thank you for coming in to clinic!     Garry Scruggs,        Resulted Orders   TSH with free T4 reflex   Result Value Ref Range    TSH 2.38 0.30 - 4.20 uIU/mL   Vitamin B12   Result Value Ref Range    Vitamin B12 436 232 - 1,245 pg/mL   Ferritin   Result Value Ref Range    Ferritin 9 6 - 175 ng/mL   Iron and iron binding capacity   Result Value Ref Range    Iron 26 (L) 37 - 145 ug/dL    Iron Binding Capacity 390 240 - 430 ug/dL    Iron Sat Index 7 (L) 15 - 46 %   Hepatitis C Screen Reflex to HCV RNA Quant and Genotype   Result Value Ref Range    Hepatitis C Antibody Nonreactive Nonreactive    Narrative    Assay performance characteristics have not been established for newborns, infants, and children.   PAP screen with HPV - recommended age 30 - 65 years   Result Value Ref Range    Interpretation        Negative for Intraepithelial Lesion or Malignancy (NILM)    Comment         Papanicolaou Test Limitations:  Cervical cytology is a screening test with limited sensitivity, and regular screening is critical for cancer prevention.  Pap tests are primarily effective for the diagnosis/prevention of squamous cell carcinoma, not adenocarcinoma or other cancers.        Specimen Adequacy       Satisfactory for evaluation, endocervical/transformation zone component present    Clinical Information       none      LMP/Menopause Date       12/5/2022      Reflex Testing Yes regardless of result     Previous Abnormal?       No      Performing Labs       The technical component of this testing was completed at [facility]     Wet preparation   Result Value Ref Range    Trichomonas Absent Absent    Yeast Absent Absent    Clue Cells  Present (A) Absent    WBCs/high power field 4+ (A) None    Narrative    No odor  No ph  Many bacteria       If you have any questions or concerns, please call the clinic at the number listed above.       Sincerely,      Hung Sanders MD

## 2022-12-29 LAB — HCV AB SERPL QL IA: NONREACTIVE

## 2022-12-29 RX ORDER — FERROUS SULFATE 325(65) MG
325 TABLET ORAL
Qty: 90 TABLET | Refills: 0 | Status: SHIPPED | OUTPATIENT
Start: 2022-12-29 | End: 2023-06-22

## 2022-12-29 ASSESSMENT — ENCOUNTER SYMPTOMS
PALPITATIONS: 1
BREAST MASS: 0
HEADACHES: 0
HEMATOCHEZIA: 0
ABDOMINAL PAIN: 0
JOINT SWELLING: 0
SHORTNESS OF BREATH: 0
WEAKNESS: 0
FREQUENCY: 0
DYSURIA: 0
HEARTBURN: 1
DIARRHEA: 0
NAUSEA: 0
CHILLS: 0
COUGH: 0
DIZZINESS: 0
HEMATURIA: 0
CONSTIPATION: 0
ARTHRALGIAS: 0
NERVOUS/ANXIOUS: 0
SORE THROAT: 0
PARESTHESIAS: 0
EYE PAIN: 0
MYALGIAS: 0
FEVER: 0

## 2022-12-30 LAB
BKR LAB AP GYN ADEQUACY: NORMAL
BKR LAB AP GYN INTERPRETATION: NORMAL
BKR LAB AP HPV REFLEX: NORMAL
BKR LAB AP LMP: NORMAL
BKR LAB AP PREVIOUS ABNORMAL: NORMAL
PATH REPORT.COMMENTS IMP SPEC: NORMAL
PATH REPORT.COMMENTS IMP SPEC: NORMAL
PATH REPORT.RELEVANT HX SPEC: NORMAL

## 2022-12-30 NOTE — RESULT ENCOUNTER NOTE
Team,     Please send letter to patient with PAP result and following message: Your cervical cancer screening came back normal, negative for cancer cells. This is good news! Thank you for coming in to clinic!    Garry Scruggs, DO

## 2023-01-02 LAB
HUMAN PAPILLOMA VIRUS 16 DNA: NEGATIVE
HUMAN PAPILLOMA VIRUS 18 DNA: NEGATIVE
HUMAN PAPILLOMA VIRUS FINAL DIAGNOSIS: NORMAL
HUMAN PAPILLOMA VIRUS OTHER HR: NEGATIVE

## 2023-01-03 ENCOUNTER — TELEPHONE (OUTPATIENT)
Dept: FAMILY MEDICINE | Facility: CLINIC | Age: 48
End: 2023-01-03

## 2023-01-03 NOTE — TELEPHONE ENCOUNTER
Spoke with pt on medication that Dr Lu sent to pharmacy last week. Said she did  and is using and is getting better

## 2023-01-04 PROCEDURE — 82274 ASSAY TEST FOR BLOOD FECAL: CPT | Performed by: STUDENT IN AN ORGANIZED HEALTH CARE EDUCATION/TRAINING PROGRAM

## 2023-01-04 NOTE — RESULT ENCOUNTER NOTE
Called patient and spoke to daughter with Dr Scruggs message.   Jimmie, your iron levels are low. I recommend starting an iron supplementation. This has been sent to your pharmacy. Please call if you have any questions. Thanks!     Garry Scruggs, DO

## 2023-01-07 LAB — HEMOCCULT STL QL IA: NEGATIVE

## 2023-06-20 ENCOUNTER — APPOINTMENT (OUTPATIENT)
Dept: CT IMAGING | Facility: HOSPITAL | Age: 48
End: 2023-06-20
Attending: EMERGENCY MEDICINE
Payer: COMMERCIAL

## 2023-06-20 ENCOUNTER — HOSPITAL ENCOUNTER (EMERGENCY)
Facility: HOSPITAL | Age: 48
Discharge: HOME | End: 2023-06-20
Attending: EMERGENCY MEDICINE | Admitting: EMERGENCY MEDICINE
Payer: COMMERCIAL

## 2023-06-20 ENCOUNTER — APPOINTMENT (OUTPATIENT)
Dept: RADIOLOGY | Facility: HOSPITAL | Age: 48
End: 2023-06-20
Attending: EMERGENCY MEDICINE
Payer: COMMERCIAL

## 2023-06-20 VITALS
WEIGHT: 150 LBS | HEIGHT: 62 IN | HEART RATE: 71 BPM | BODY MASS INDEX: 27.6 KG/M2 | DIASTOLIC BLOOD PRESSURE: 76 MMHG | TEMPERATURE: 98.4 F | RESPIRATION RATE: 18 BRPM | OXYGEN SATURATION: 100 % | SYSTOLIC BLOOD PRESSURE: 131 MMHG

## 2023-06-20 DIAGNOSIS — S93.402A SPRAIN OF LEFT ANKLE, UNSPECIFIED LIGAMENT, INITIAL ENCOUNTER: ICD-10-CM

## 2023-06-20 PROCEDURE — 70450 CT HEAD/BRAIN W/O DYE: CPT

## 2023-06-20 PROCEDURE — 73610 X-RAY EXAM OF ANKLE: CPT | Mod: LT

## 2023-06-20 PROCEDURE — 99285 EMERGENCY DEPT VISIT HI MDM: CPT | Mod: 25

## 2023-06-20 PROCEDURE — 72125 CT NECK SPINE W/O DYE: CPT

## 2023-06-20 PROCEDURE — 250N000011 HC RX IP 250 OP 636: Performed by: EMERGENCY MEDICINE

## 2023-06-20 PROCEDURE — 96374 THER/PROPH/DIAG INJ IV PUSH: CPT

## 2023-06-20 PROCEDURE — 72170 X-RAY EXAM OF PELVIS: CPT

## 2023-06-20 RX ORDER — ONDANSETRON 2 MG/ML
4 INJECTION INTRAMUSCULAR; INTRAVENOUS ONCE
Status: COMPLETED | OUTPATIENT
Start: 2023-06-20 | End: 2023-06-20

## 2023-06-20 RX ORDER — IBUPROFEN 600 MG/1
600 TABLET, FILM COATED ORAL ONCE
Status: COMPLETED | OUTPATIENT
Start: 2023-06-20 | End: 2023-06-20

## 2023-06-20 RX ADMIN — ONDANSETRON 4 MG: 2 INJECTION INTRAMUSCULAR; INTRAVENOUS at 22:15

## 2023-06-20 ASSESSMENT — ACTIVITIES OF DAILY LIVING (ADL): ADLS_ACUITY_SCORE: 35

## 2023-06-21 ENCOUNTER — PATIENT OUTREACH (OUTPATIENT)
Dept: CARE COORDINATION | Facility: CLINIC | Age: 48
End: 2023-06-21
Payer: COMMERCIAL

## 2023-06-21 NOTE — ED TRIAGE NOTES
Pt fell while walking off a curb, twisting right ankle. Pt told ems that she had mild neck pain- c-collar in place.  Pt had 100mcg Fentanyl IV in route     Triage Assessment     Row Name 06/20/23 2044       Triage Assessment (Adult)    Airway WDL WDL       Respiratory WDL    Respiratory WDL WDL

## 2023-06-21 NOTE — ED PROVIDER NOTES
EMERGENCY DEPARTMENT ENCOUNTER      NAME: Jimmie Barrientos  AGE: 48 year old female  YOB: 1975  MRN: [id number]  EVALUATION DATE & TIME: No admission date for patient encounter.    PCP: Garry Lu    ED PROVIDER: Syl Cameron M.D.      Chief Complaint   Patient presents with     Ankle Pain     Fall         FINAL IMPRESSION:  1. Sprain of left ankle, unspecified ligament, initial encounter          ED COURSE & MEDICAL DECISION MAKING:    ED Course as of 06/21/23 0140   Tue Jun 20, 2023   2222 Pt BIB EMS s/p witnessed trip and fall while stepping off curb at [employer] tonight and fall onto buttock, low back nontender, she had c/o neck pain after her fall to EMS but did not strike head or neck per daughter and per patient, in c-collar, still slightly tender right now diffusely to posterior neck, given fentanyl by EMS and then many episodes vomiting, uncertain whether this is due to nausea from fentanyl administration or from fall, CT head pending in poor historian and XR left lateral ankle which is tender, ibuprofen/ice begun with compression and elevation of sprained ankle, XR buttock and CT head and c-spine pending to ensure no other traumatic pathology, patient and daughter ok with plan   2315 Reassuringly no acute c-spine fracture present, c-collar removed by me and patient neurovascularly intact. CT head without intracranial pathology and no further vomiting thus likely nausea/vomiting after EMS fentanyl now better. XR ankle without clear fracture, crutches and ACE wrap provided with air cast and orthopedics follow up and plans to begin RICE therapy at home. Patient discharged after being provided with extensive anticipatory guidance and given return precautions, importance of PMD follow-up emphasized.      10:00 PM I met with patient for initial encounter.  11:20 PM I updated patient with results and plan for discharge.    Pertinent Labs & Imaging studies reviewed. (See chart for details)    N95  worn  A face shield was worn also  COVID PPE    Medical Decision Making    History:    Supplemental history from: Family Member/Significant Other    External Record(s) reviewed: Documented in chart, if applicable.    Work Up:    Chart documentation includes differential considered and any EKGs or imaging independently interpreted by provider, where specified.    In additional to work up documented, I considered the following work up: Documented in chart, if applicable.    External consultation:    Discussion of management with another provider: Documented in chart, if applicable    Complicating factors:    Care impacted by chronic illness: N/A    Care affected by social determinants of health: N/A    Disposition considerations: Discharge. No recommendations on prescription strength medication(s). See documentation for any additional details.        At the conclusion of the encounter I discussed the results of all of the tests and the disposition. The questions were answered. The patient or family acknowledged understanding and was agreeable with the care plan.     MEDICATIONS GIVEN IN THE EMERGENCY:  Medications   ondansetron (ZOFRAN) injection 4 mg (4 mg Intravenous $Given 6/20/23 2993)   ibuprofen (ADVIL/MOTRIN) tablet 600 mg (600 mg Oral Not Given 6/20/23 7543)       NEW PRESCRIPTIONS STARTED AT TODAY'S ER VISIT  Discharge Medication List as of 6/20/2023 11:30 PM             =================================================================    HPI      Jimmie Barrientos is a 48 year old female with no PMHx who presents to the ED today via EMS with right ankle pain.    Per family member, patient twisted her left ankle while stepping off a curb tonight and fell to the ground, hitting her buttocks and bilateral elbows. Patient is unsure whether she hit her head, but denies LOC. She is not on blood thinners.     Patient currently endorses left ankle pain that is worse with movement. EMS gave patient 100 mcg fentanyl for pain,  and patient reports pain is 4/10 after fentanyl. She also endorses a vision change that began after she fell, and nausea and vomiting that began after she received fentanyl. Patient endorsed neck pain for EMS who placed C collar but denies neck pain in ED.    Patient denies neck pain, back pain, shortness of breath, and all other complaints at this time.      REVIEW OF SYSTEMS   All other systems reviewed and are negative except as noted above in HPI.    PAST MEDICAL HISTORY:  Past Medical History:   Diagnosis Date     Abnormal maternal glucose tolerance, antepartum 1/8/2013     Abnormal maternal glucose tolerance, complicating pregnancy, childbirth, or the puerperium, unspecified as to episode of care 1/8/2013     Back pain      GERD (gastroesophageal reflux disease)      Hepatitis B carrier (H) 1/8/2013     History of stomach ulcers      Kidney stone        PAST SURGICAL HISTORY:  Past Surgical History:   Procedure Laterality Date     NO HISTORY OF SURGERY       NO PAST SURGERIES         CURRENT MEDICATIONS:    ASPIRIN NOT PRESCRIBED (INTENTIONAL)  blood glucose (NO BRAND SPECIFIED) lancets standard  blood glucose (NO BRAND SPECIFIED) test strip  blood glucose monitoring (NO BRAND SPECIFIED) meter device kit  ferrous sulfate (FEROSUL) 325 (65 Fe) MG tablet  Global Inject Ease Lancets 30G MISC  metFORMIN (GLUCOPHAGE) 500 MG tablet  STATIN NOT PRESCRIBED (INTENTIONAL)        ALLERGIES:  Allergies   Allergen Reactions     No Known Allergies        FAMILY HISTORY:  Family History   Problem Relation Age of Onset     Family History Negative Other      Diabetes Mother      Diabetes Brother      Coronary Artery Disease No family hx of      Breast Cancer No family hx of      Colon Cancer No family hx of      Prostate Cancer No family hx of      Other Cancer No family hx of      Urolithiasis Brother      Clotting Disorder No family hx of      Gout No family hx of      Cancer No family hx of      Heart Disease No family hx  "of        SOCIAL HISTORY:   Social History     Socioeconomic History     Marital status:      Spouse name: None     Number of children: None     Years of education: None     Highest education level: None   Tobacco Use     Smoking status: Never     Smokeless tobacco: Never   Substance and Sexual Activity     Alcohol use: No     Alcohol/week: 0.0 standard drinks of alcohol     Drug use: No     Social Determinants of Health     Physical Activity: Unknown (9/12/2019)    Exercise Vital Sign      Days of Exercise per Week: 4 days       VITALS:  Patient Vitals for the past 24 hrs:   BP Temp Pulse Resp SpO2 Height Weight   06/20/23 2327 131/76 -- 71 -- 100 % -- --   06/20/23 2040 (!) 156/88 98.4  F (36.9  C) 76 18 98 % 1.575 m (5' 2\") 68 kg (150 lb)       PHYSICAL EXAM      VITAL SIGNS: /76   Pulse 71   Temp 98.4  F (36.9  C)   Resp 18   Ht 1.575 m (5' 2\")   Wt 68 kg (150 lb)   SpO2 100%   BMI 27.44 kg/m     GENERAL: Awake, alert.  In no acute distress. GCS 15  HEENT: Normocephalic, atraumatic.  Pupils equal, round and reactive.  Conjunctiva normal.  EOMI. No gunter sign, no racoon eyes, no mastoid tenderness, no hemotympanum, no facial instability, no nasal bridge pain, no nasal septal hematoma, no intraoral lacerations, no loose teeth, no mandible pain or deformity. Cervical collar in place.  NECK: No stridor or apparent deformity. No midline pain to palpation.  PULMONARY: Symmetrical breath sounds without distress.  Lungs clear to auscultation bilaterally without wheezes, rhonchi or rales.  CARDIO: Regular rate and rhythm.  No significant murmur, rub or gallop.  Radial pulses strong and symmetrical.  THORAX: No focal chest wall deformity or crepitus  BACK: No focal tenderness or deformity to each vertebral level in midline  ABDOMINAL: Abdomen soft, non-distended and non-tender to palpation.  No CVAT, BL.  EXTREMITIES: No lower extremity edema. Pelvis stable and without focal tenderness. Bilateral " pedal pulses 2+ and equal. Left lateral ankle tender and slightly swollen.  NEURO: Alert and oriented to person, place and time.  Cranial nerves grossly intact.  No focal motor deficit. Sensation globally intact.  PSYCH: Normal mood and affect  SKIN: No rashes        LAB:  All pertinent labs reviewed and interpreted.  Results for orders placed or performed during the hospital encounter of 06/20/23   XR Ankle Left G/E 3 Views    Impression    IMPRESSION: Anatomic alignment of the left ankle. No fracture or dislocation. Ankle mortise is symmetric. Talar dome is smooth. Slight soft tissue swelling about the left ankle, more laterally. Minor plantar calcaneal spur.   CT Head w/o Contrast    Impression    IMPRESSION:  HEAD CT:  1.  No acute intracranial process.    CERVICAL SPINE CT:  1.  No CT evidence for acute fracture or post traumatic subluxation.  2.  Suggestion of right vocal cord paralysis.   CT Cervical Spine w/o Contrast    Impression    IMPRESSION:  HEAD CT:  1.  No acute intracranial process.    CERVICAL SPINE CT:  1.  No CT evidence for acute fracture or post traumatic subluxation.  2.  Suggestion of right vocal cord paralysis.   XR Pelvis 1/2 Views    Impression    IMPRESSION: Normal joint spaces and alignment. No fracture.       RADIOLOGY:  Reviewed all pertinent imaging. Please see official radiology report.  XR Pelvis 1/2 Views   Final Result   IMPRESSION: Normal joint spaces and alignment. No fracture.      XR Ankle Left G/E 3 Views   Final Result   IMPRESSION: Anatomic alignment of the left ankle. No fracture or dislocation. Ankle mortise is symmetric. Talar dome is smooth. Slight soft tissue swelling about the left ankle, more laterally. Minor plantar calcaneal spur.      CT Cervical Spine w/o Contrast   Final Result   IMPRESSION:   HEAD CT:   1.  No acute intracranial process.      CERVICAL SPINE CT:   1.  No CT evidence for acute fracture or post traumatic subluxation.   2.  Suggestion of right  vocal cord paralysis.      CT Head w/o Contrast   Final Result   IMPRESSION:   HEAD CT:   1.  No acute intracranial process.      CERVICAL SPINE CT:   1.  No CT evidence for acute fracture or post traumatic subluxation.   2.  Suggestion of right vocal cord paralysis.                  I, Toby Erazo, am serving as a scribe to document services personally performed by Dr. Syl Cameron based on my observation and the provider's statements to me. I, Syl Cameron MD attest that Toby Erazo is acting in a scribe capacity, has observed my performance of the services and has documented them in accordance with my direction.      Syl Cameron MD  06/21/23 0141

## 2023-06-22 ENCOUNTER — OFFICE VISIT (OUTPATIENT)
Dept: FAMILY MEDICINE | Facility: CLINIC | Age: 48
End: 2023-06-22
Payer: COMMERCIAL

## 2023-06-22 VITALS
OXYGEN SATURATION: 98 % | WEIGHT: 157.8 LBS | SYSTOLIC BLOOD PRESSURE: 117 MMHG | TEMPERATURE: 97.8 F | DIASTOLIC BLOOD PRESSURE: 80 MMHG | BODY MASS INDEX: 28.86 KG/M2 | HEART RATE: 69 BPM

## 2023-06-22 DIAGNOSIS — Z09 ENCOUNTER FOR EXAMINATION FOLLOWING TREATMENT AT HOSPITAL: Primary | ICD-10-CM

## 2023-06-22 DIAGNOSIS — S93.402A SPRAIN OF LEFT ANKLE, UNSPECIFIED LIGAMENT, INITIAL ENCOUNTER: ICD-10-CM

## 2023-06-22 DIAGNOSIS — E11.9 TYPE 2 DIABETES MELLITUS WITHOUT COMPLICATION, WITHOUT LONG-TERM CURRENT USE OF INSULIN (H): ICD-10-CM

## 2023-06-22 LAB — HBA1C MFR BLD: 7.5 % (ref 0–5.6)

## 2023-06-22 PROCEDURE — 83036 HEMOGLOBIN GLYCOSYLATED A1C: CPT

## 2023-06-22 PROCEDURE — 99214 OFFICE O/P EST MOD 30 MIN: CPT | Mod: GC

## 2023-06-22 PROCEDURE — 36416 COLLJ CAPILLARY BLOOD SPEC: CPT

## 2023-06-22 RX ORDER — ACETAMINOPHEN 500 MG
1000 TABLET ORAL EVERY 8 HOURS PRN
Qty: 100 TABLET | Refills: 0 | Status: SHIPPED | OUTPATIENT
Start: 2023-06-22

## 2023-06-22 NOTE — PROGRESS NOTES
"  Assessment & Plan   (Z09) Encounter for examination following treatment at hospital  (primary encounter diagnosis)  (S93.402A) Sprain of left ankle, unspecified ligament, initial encounter  Comment: Doing better. Advised to keep ankle elevated and ice at home for 20 minutes at a time. Should wear shoes with higher ankles plus AirCast if possible. Ambulating with cane ok, advised to weight bear as tolerated. Can take 1000mg of Tylenol q8hrs for pain. Would consider diclofenac if not resolving. Should follow up in 2-4 weeks if still painful, consider PT at that time.  Plan: acetaminophen (TYLENOL) 500 MG tablet    (E11.9) Type 2 diabetes mellitus without complication, without long-term current use of insulin (H)  Comment: Appears well. Will draw A1c today, recommend follow-up based on results.  Plan: Hemoglobin A1c           MED REC REQUIRED  Post Medication Reconciliation Status:  Discharge medications reconciled and changed, see notes/orders    BMI:   Estimated body mass index is 28.86 kg/m  as calculated from the following:    Height as of 6/20/23: 1.575 m (5' 2\").    Weight as of this encounter: 71.6 kg (157 lb 12.8 oz).   Weight management plan: Patient was referred to their PCP to discuss a diet and exercise plan.    No follow-ups on file.    Yolie Dale MD  [facility]  Precepted patient with Dr. Hung Sandesr.      Kwesi Samuel is a 48 year old, presenting for the following health issues:  Hospital F/U (Left foot swollen no fractures)        6/22/2023     2:02 PM   Additional Questions   Roomed by Lucila\"     [facility]       ED Follow-up Visit:    Hospital/Nursing Home/IP Rehab Facility: [facility]  Date of Admission: 6/20/2023  Date of Discharge: 6/20/2023  Reason(s) for Admission: fall    Was your hospitalization related to COVID-19? No   Problems taking medications regularly:  None  Medication changes since discharge: None  Problems adhering to " non-medication therapy:  None    Summary of hospitalization:  [facility] discharge summary reviewed  Diagnostic Tests/Treatments reviewed.  Follow up needed: none  Other Healthcare Providers Involved in Patient s Care:         None  Update since discharge: stable    Patient fell when she was carrying pizza boxes and a 2L of pop out from the parking lot of a [employer]'s  Doesn't remember exactly what happened but she does remember tripping over her left ankle and falling on her right side  Pizza went flying, pop spilled, and she had pain on the right side of her body and neck  Did hit her head as well  Went to [facility] for imaging  XR left ankle, CT head and spine negative for fractures  Has been in an Aircast and wrap since  Dx with ankle sprain  Has been having trouble walking since then, does not know how to use crutches so generally ambulating with a cane since discharge  Tries to rest at home and elevate her foot on the couch  Lives with children at home, youngest who is 10 years old and all who can help   Pain managed mostly with 1 x 500mg Tylenol daily and some topical      Of note, patient does have a PMHx of T2DM  On 500mg metformin  Last A1c check 12/21/2022 at 7.3, on 500mg metformin at home  Home BG have been in the 130's fasting, so unlikely that this fall is BG related  Ok with checking A1c again today      Review of Systems   Constitutional, HEENT, cardiovascular, pulmonary, gi and gu systems are negative, except as otherwise noted.      Objective    /80   Pulse 69   Temp 97.8  F (36.6  C) (Oral)   Wt 71.6 kg (157 lb 12.8 oz)   LMP 06/03/2023 (Approximate)   SpO2 98%   BMI 28.86 kg/m    Body mass index is 28.86 kg/m .     Physical Exam   General: Alert and oriented x 3, no acute distress  Skin: clean, dry, and intact  HEENT: normocephalic, atraumatic, PERRL, EOMI, no conjunctival injection or icterus, ears and nose normal, no LAD or masses  Resp: clear to ausculation  bilaterally, no rales or wheezes  Cardio: RRR, S1 and S2 present  Abdomen: soft, nontender, nondistended, no masses.  MSK: Pain with ROM of the right flank and right ribcage, appears superficial. Left ankle limited ROM due to pain, tender along lateral malleolus and lateral side of left lower leg. Nontender over navicular or medial malleolus. Mild nonpitting edema. Strength 5/5 with pain. Right ankle full ROM without pain. Pulses 2+.           ----- Service Performed and Documented by Resident or Fellow ------

## 2023-06-23 PROBLEM — H10.45 CHRONIC ALLERGIC CONJUNCTIVITIS: Status: RESOLVED | Noted: 2018-02-14 | Resolved: 2023-06-23

## 2023-09-16 ENCOUNTER — HEALTH MAINTENANCE LETTER (OUTPATIENT)
Age: 48
End: 2023-09-16

## 2024-02-03 ENCOUNTER — HEALTH MAINTENANCE LETTER (OUTPATIENT)
Age: 49
End: 2024-02-03

## 2024-08-31 ENCOUNTER — HEALTH MAINTENANCE LETTER (OUTPATIENT)
Age: 49
End: 2024-08-31

## 2025-03-02 ENCOUNTER — HEALTH MAINTENANCE LETTER (OUTPATIENT)
Age: 50
End: 2025-03-02

## 2025-03-04 ENCOUNTER — PATIENT OUTREACH (OUTPATIENT)
Dept: FAMILY MEDICINE | Facility: CLINIC | Age: 50
End: 2025-03-04
Payer: COMMERCIAL

## 2025-03-04 NOTE — TELEPHONE ENCOUNTER
Patient Quality Outreach    Patient is due for the following:   Diabetes -  A1C    Action(s) Taken:   No follow up needed at this time.    Type of outreach:    Chart review performed, no outreach needed.    Questions for provider review:    None           Yusra Haley MA  Chart routed to Care Team.